# Patient Record
Sex: FEMALE | Race: WHITE | NOT HISPANIC OR LATINO
[De-identification: names, ages, dates, MRNs, and addresses within clinical notes are randomized per-mention and may not be internally consistent; named-entity substitution may affect disease eponyms.]

---
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
## 2021-03-08 ENCOUNTER — APPOINTMENT (OUTPATIENT)
Dept: PEDIATRIC PULMONARY CYSTIC FIB | Facility: CLINIC | Age: 21
End: 2021-03-08
Payer: COMMERCIAL

## 2021-03-08 VITALS
OXYGEN SATURATION: 99 % | HEART RATE: 89 BPM | BODY MASS INDEX: 34.09 KG/M2 | TEMPERATURE: 97.5 F | SYSTOLIC BLOOD PRESSURE: 145 MMHG | WEIGHT: 190 LBS | HEIGHT: 62.75 IN | DIASTOLIC BLOOD PRESSURE: 87 MMHG

## 2021-03-08 PROCEDURE — 99072 ADDL SUPL MATRL&STAF TM PHE: CPT

## 2021-03-08 PROCEDURE — 99215 OFFICE O/P EST HI 40 MIN: CPT | Mod: 25

## 2021-03-08 PROCEDURE — 95012 NITRIC OXIDE EXP GAS DETER: CPT

## 2021-03-08 NOTE — REASON FOR VISIT
[Routine Follow-Up] : a routine follow-up visit for [Patient] : patient [Mother] : mother [Asthma/RAD] : asthma/RAD [Exercise Induced Dyspnea] : exercise induced dyspnea [Rhinitis] : rhinitis [FreeTextEntry3] : History of epilepsy on medicine from Dr. Macias, this is an in person visit

## 2021-03-08 NOTE — ASSESSMENT
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

## 2021-03-08 NOTE — ASSESSMENT
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

## 2021-03-08 NOTE — HISTORY OF PRESENT ILLNESS
[FreeTextEntry1] : .  Patient is followed by pediatric pulmonary for\par 1 chronic asthma mild persistent\par 2 chronic rhinitis\par 3.  Exercise-induced asthma\par 4 obesity BMI BMI 97 percentile\par \par Since last seen patient symptoms has been              controlled well\par \par PULMONARY HPI FOR TODAY VISIT\par \par Activity: there is  no    complaint of  activity limitation : \par \par there is improvement in coughing,          wheezing, shortness of breath\par there is no stridor, distress, loss of energy, hemoptysis, fever, night sweat, weight loss\par  \par CXR:  patient has no recent Chest X Ray , no history of pneumonia\par \par SLEEP :   No snoring, restless, daytime sleepiness, bedtime issues, \par \par \par ASTHMA HPI : Asthma symptoms well controlled by Rules of Twos (day symptoms < 2 x/week; night symptoms < 2x /month, no /minimal limitations of activities, less than 2 courses of systemic steroid per 12 month, no ED visits/ hospitalization )\par \par We also discussed extensively\par

## 2021-03-15 ENCOUNTER — APPOINTMENT (OUTPATIENT)
Dept: PEDIATRIC NEUROLOGY | Facility: CLINIC | Age: 21
End: 2021-03-15
Payer: COMMERCIAL

## 2021-03-15 VITALS
WEIGHT: 190.13 LBS | HEART RATE: 40 BPM | OXYGEN SATURATION: 98 % | HEIGHT: 63 IN | DIASTOLIC BLOOD PRESSURE: 100 MMHG | BODY MASS INDEX: 33.69 KG/M2 | SYSTOLIC BLOOD PRESSURE: 136 MMHG

## 2021-03-15 DIAGNOSIS — G40.309 GENERALIZED IDIOPATHIC EPILEPSY AND EPILEPTIC SYNDROMES, NOT INTRACTABLE, W/OUT STATUS EPILEPTICUS: ICD-10-CM

## 2021-03-15 PROCEDURE — 99213 OFFICE O/P EST LOW 20 MIN: CPT

## 2021-03-15 PROCEDURE — 99072 ADDL SUPL MATRL&STAF TM PHE: CPT

## 2021-03-15 NOTE — REVIEW OF SYSTEMS
Number on file not accepting calls at this time. Will attempt at a later date. [Normal] : Psychiatric

## 2021-03-15 NOTE — HISTORY OF PRESENT ILLNESS
[FreeTextEntry1] : Nan presents in follow-up of her epilepsy.  She was last seen a year ago.  She has remained clinically seizure-free and has been compliant with her medication.  She is not experiencing any medication side effects.

## 2021-03-15 NOTE — ASSESSMENT
[FreeTextEntry1] : 20-year-old history of generalized epilepsy who has been clinically seizure-free for a number of years.  Will continue the current dose of medication.\par \par 1.  Lab work to be sent today for medication level as well as screening CBC, CMP, thyroid and cholesterol panel\par \par 2.  Plan for repeat overnight EEG next January

## 2021-06-01 ENCOUNTER — RX RENEWAL (OUTPATIENT)
Age: 21
End: 2021-06-01

## 2021-09-08 ENCOUNTER — APPOINTMENT (OUTPATIENT)
Dept: PEDIATRIC PULMONARY CYSTIC FIB | Facility: CLINIC | Age: 21
End: 2021-09-08

## 2021-09-26 ENCOUNTER — EMERGENCY (EMERGENCY)
Facility: HOSPITAL | Age: 21
LOS: 0 days | Discharge: HOME | End: 2021-09-26
Attending: EMERGENCY MEDICINE | Admitting: EMERGENCY MEDICINE
Payer: COMMERCIAL

## 2021-09-26 VITALS
SYSTOLIC BLOOD PRESSURE: 119 MMHG | TEMPERATURE: 97 F | DIASTOLIC BLOOD PRESSURE: 73 MMHG | RESPIRATION RATE: 16 BRPM | OXYGEN SATURATION: 99 % | HEART RATE: 90 BPM

## 2021-09-26 VITALS — WEIGHT: 149.91 LBS

## 2021-09-26 DIAGNOSIS — Z91.013 ALLERGY TO SEAFOOD: ICD-10-CM

## 2021-09-26 DIAGNOSIS — I47.1 SUPRAVENTRICULAR TACHYCARDIA: ICD-10-CM

## 2021-09-26 DIAGNOSIS — J45.909 UNSPECIFIED ASTHMA, UNCOMPLICATED: ICD-10-CM

## 2021-09-26 DIAGNOSIS — R06.02 SHORTNESS OF BREATH: ICD-10-CM

## 2021-09-26 DIAGNOSIS — R00.2 PALPITATIONS: ICD-10-CM

## 2021-09-26 DIAGNOSIS — Z86.69 PERSONAL HISTORY OF OTHER DISEASES OF THE NERVOUS SYSTEM AND SENSE ORGANS: ICD-10-CM

## 2021-09-26 LAB
ALBUMIN SERPL ELPH-MCNC: 4.3 G/DL — SIGNIFICANT CHANGE UP (ref 3.5–5.2)
ALP SERPL-CCNC: 109 U/L — SIGNIFICANT CHANGE UP (ref 30–115)
ALT FLD-CCNC: 13 U/L — LOW (ref 14–37)
ANION GAP SERPL CALC-SCNC: 13 MMOL/L — SIGNIFICANT CHANGE UP (ref 7–14)
AST SERPL-CCNC: 12 U/L — LOW (ref 14–37)
BASOPHILS # BLD AUTO: 0.09 K/UL — SIGNIFICANT CHANGE UP (ref 0–0.2)
BASOPHILS NFR BLD AUTO: 0.6 % — SIGNIFICANT CHANGE UP (ref 0–1)
BILIRUB SERPL-MCNC: <0.2 MG/DL — SIGNIFICANT CHANGE UP (ref 0.2–1.2)
BUN SERPL-MCNC: 14 MG/DL — SIGNIFICANT CHANGE UP (ref 10–20)
CALCIUM SERPL-MCNC: 9.3 MG/DL — SIGNIFICANT CHANGE UP (ref 8.5–10.1)
CHLORIDE SERPL-SCNC: 105 MMOL/L — SIGNIFICANT CHANGE UP (ref 98–110)
CO2 SERPL-SCNC: 21 MMOL/L — SIGNIFICANT CHANGE UP (ref 17–32)
CREAT SERPL-MCNC: 0.9 MG/DL — SIGNIFICANT CHANGE UP (ref 0.3–1)
EOSINOPHIL # BLD AUTO: 0.21 K/UL — SIGNIFICANT CHANGE UP (ref 0–0.7)
EOSINOPHIL NFR BLD AUTO: 1.4 % — SIGNIFICANT CHANGE UP (ref 0–8)
GLUCOSE SERPL-MCNC: 132 MG/DL — HIGH (ref 70–99)
HCG SERPL QL: NEGATIVE — SIGNIFICANT CHANGE UP
HCT VFR BLD CALC: 40.9 % — SIGNIFICANT CHANGE UP (ref 37–47)
HGB BLD-MCNC: 13.7 G/DL — SIGNIFICANT CHANGE UP (ref 12–16)
IMM GRANULOCYTES NFR BLD AUTO: 0.5 % — HIGH (ref 0.1–0.3)
LYMPHOCYTES # BLD AUTO: 14.2 % — LOW (ref 20.5–51.1)
LYMPHOCYTES # BLD AUTO: 2.13 K/UL — SIGNIFICANT CHANGE UP (ref 1.2–3.4)
MAGNESIUM SERPL-MCNC: 1.7 MG/DL — LOW (ref 1.8–2.4)
MCHC RBC-ENTMCNC: 29.8 PG — SIGNIFICANT CHANGE UP (ref 27–31)
MCHC RBC-ENTMCNC: 33.5 G/DL — SIGNIFICANT CHANGE UP (ref 32–37)
MCV RBC AUTO: 89.1 FL — SIGNIFICANT CHANGE UP (ref 81–99)
MONOCYTES # BLD AUTO: 0.74 K/UL — HIGH (ref 0.1–0.6)
MONOCYTES NFR BLD AUTO: 4.9 % — SIGNIFICANT CHANGE UP (ref 1.7–9.3)
NEUTROPHILS # BLD AUTO: 11.71 K/UL — HIGH (ref 1.4–6.5)
NEUTROPHILS NFR BLD AUTO: 78.4 % — HIGH (ref 42.2–75.2)
NRBC # BLD: 0 /100 WBCS — SIGNIFICANT CHANGE UP (ref 0–0)
PLATELET # BLD AUTO: 395 K/UL — SIGNIFICANT CHANGE UP (ref 130–400)
POTASSIUM SERPL-MCNC: 3.9 MMOL/L — SIGNIFICANT CHANGE UP (ref 3.5–5)
POTASSIUM SERPL-SCNC: 3.9 MMOL/L — SIGNIFICANT CHANGE UP (ref 3.5–5)
PROT SERPL-MCNC: 6.8 G/DL — SIGNIFICANT CHANGE UP (ref 6.1–8)
RBC # BLD: 4.59 M/UL — SIGNIFICANT CHANGE UP (ref 4.2–5.4)
RBC # FLD: 12.7 % — SIGNIFICANT CHANGE UP (ref 11.5–14.5)
SODIUM SERPL-SCNC: 139 MMOL/L — SIGNIFICANT CHANGE UP (ref 135–146)
WBC # BLD: 14.96 K/UL — HIGH (ref 4.8–10.8)
WBC # FLD AUTO: 14.96 K/UL — HIGH (ref 4.8–10.8)

## 2021-09-26 PROCEDURE — 93010 ELECTROCARDIOGRAM REPORT: CPT

## 2021-09-26 PROCEDURE — 99284 EMERGENCY DEPT VISIT MOD MDM: CPT | Mod: 25

## 2021-09-26 RX ORDER — SODIUM CHLORIDE 9 MG/ML
1000 INJECTION, SOLUTION INTRAVENOUS ONCE
Refills: 0 | Status: COMPLETED | OUTPATIENT
Start: 2021-09-26 | End: 2021-09-26

## 2021-09-26 RX ORDER — MAGNESIUM SULFATE 500 MG/ML
2 VIAL (ML) INJECTION ONCE
Refills: 0 | Status: COMPLETED | OUTPATIENT
Start: 2021-09-26 | End: 2021-09-26

## 2021-09-26 RX ORDER — SODIUM CHLORIDE 9 MG/ML
1000 INJECTION INTRAMUSCULAR; INTRAVENOUS; SUBCUTANEOUS ONCE
Refills: 0 | Status: COMPLETED | OUTPATIENT
Start: 2021-09-26 | End: 2021-09-26

## 2021-09-26 RX ADMIN — SODIUM CHLORIDE 1000 MILLILITER(S): 9 INJECTION INTRAMUSCULAR; INTRAVENOUS; SUBCUTANEOUS at 01:06

## 2021-09-26 RX ADMIN — Medication 50 GRAM(S): at 02:27

## 2021-09-26 RX ADMIN — SODIUM CHLORIDE 1000 MILLILITER(S): 9 INJECTION, SOLUTION INTRAVENOUS at 02:30

## 2021-09-26 NOTE — ED PROVIDER NOTE - CARE PROVIDER_API CALL
Dar Salazar  CARDIOLOGY  [address] [place] [address]  Phone: [phone]  Fax: [phone]  Follow Up Time: 1-3 Days    Yoni Young)  Pediatrics  [facility], [facility] [place] [address]  Phone: [phone]  Fax: [phone]  Follow Up Time: 1-3 Days    Michael Santiago)  Cardiac Electrophysiology; Cardiovascular Disease  [address] [place] [address]  Phone: [phone]  Fax: [phone]  Follow Up Time: 1-3 Days

## 2021-09-26 NOTE — ED PROVIDER NOTE - ATTENDING CONTRIBUTION TO CARE
I personally evaluated the patient. I reviewed the Resident’s or Physician Assistant’s note (as assigned above), and agree with the findings and plan except as documented in my note.  Chart reviewed.  Smoked THC and experienced palpitations and SOB.  Found to be in SVT by EMS, given adenosine with improvement.  Exam shows alert patient in no distress, HEENT NCAT, lungs clear, tachycardic, abdomen soft Nt +BS, no CCE.

## 2021-09-26 NOTE — ED PROVIDER NOTE - PROGRESS NOTE DETAILS
FF: pt heart rate has improved to 77bpm. pt reports she is feeling well. advised of return precautions discussed at bedside. agreeable to dc.

## 2021-09-26 NOTE — ED PROVIDER NOTE - NS ED ROS FT
CONST: No fever, chills or bodyaches  EYES: No pain, redness, drainage or visual changes.  ENT: No ear pain or discharge, nasal discharge or congestion. No sore throat  CARD: No chest pain, (+) palpitations  RESP: (+) SOB. No cough, hemoptysis. No hx of asthma or COPD  GI: No abdominal pain, N/V/D  : No urinary symptoms  MS: No joint pain, back pain or extremity pain/injury  SKIN: No rashes  NEURO: No headache, dizziness, paresthesias or LOC

## 2021-09-26 NOTE — ED PROVIDER NOTE - CLINICAL SUMMARY MEDICAL DECISION MAKING FREE TEXT BOX
Labs unremarkable except for Mg 1.7.  EKG sinus tach no acute changes.  Given IVF and Mg. Labs unremarkable except for Mg 1.7.  EKG sinus tach no acute changes.  Given IVF and Mg. Will D/C to follow up with electrophysiologist.

## 2021-09-26 NOTE — ED PROVIDER NOTE - PHYSICAL EXAMINATION
Physical Exam    Vital Signs: I have reviewed the initial vital signs.  Constitutional: well-nourished, appears stated age, no acute distress  Eyes: Conjunctiva pink, Sclera clear, PERRLA, EOMI without pain.   Cardiovascular: (+) tachycardic, regular rhythm, well-perfused extremities, radial pulses equal and 2+ b/l.   Respiratory: unlabored respiratory effort, clear to auscultation bilaterally no wheezing, rales and rhonchi. pt is speaking full sentences. no accessory muscle use.   Gastrointestinal: soft, non-tender, nondistended abdomen, no pulsatile mass, normal bowl sounds, no rebound, no guarding, no organomegaly.   Musculoskeletal: supple neck, no lower extremity edema, no calf tenderness  Integumentary: warm, dry, no rash  Neurologic: awake, alert, cranial nerves II-XII grossly intact, extremities’ motor and sensory functions grossly intact.   Psychiatric: appropriate mood, appropriate affect

## 2021-09-26 NOTE — ED PROVIDER NOTE - PROVIDER TOKENS
PROVIDER:[TOKEN:[[id number]],FOLLOWUP:[1-3 Days]],PROVIDER:[TOKEN:[[id number]],FOLLOWUP:[1-3 Days]],PROVIDER:[TOKEN:[[id number]],FOLLOWUP:[1-3 Days]]

## 2021-09-26 NOTE — ED ADULT NURSE NOTE - SUICIDE SCREENING QUESTION 1
Quality 130: Documentation Of Current Medications In The Medical Record: Current Medications Documented Quality 394a: Meningococcal Immunizations For Adolescents: Patient did not have one dose of meningococcal vaccine on or between the patient's 11th and 13th birthdays. Quality 111:Pneumonia Vaccination Status For Older Adults: Pneumococcal Vaccination not Administered or Previously Received, Reason not Otherwise Specified Quality 394b: Td/Tdap Immunizations For Adolescents: Patient did not have one Tdap or one Td vaccine on or between the patient's 10th and 13th birthdays. Quality 474: Zoster Vaccination Status: Shingrix Vaccination not Administered or Previously Received, Reason not Otherwise Specified Quality 402: Tobacco Use And Help With Quitting Among Adolescents: Patient screened for tobacco and never smoked Quality 394c: Hpv Vaccine For Adolescents: Patient did not have at least three HPV vaccines on or between the patient’s 9th and 13th birthdays. No Quality 110: Preventive Care And Screening: Influenza Immunization: Influenza immunization was not ordered or administered, reason not given Detail Level: Detailed Quality 431: Preventive Care And Screening: Unhealthy Alcohol Use - Screening: Unhealthy alcohol use screening not performed, reason not otherwise specified

## 2021-09-26 NOTE — ED PROVIDER NOTE - CARE PROVIDERS DIRECT ADDRESSES
[email or web address]

## 2021-09-26 NOTE — ED PROVIDER NOTE - NSFOLLOWUPINSTRUCTIONS_ED_ALL_ED_FT
Supraventricular Tachycardia    Supraventricular tachycardia (SVT) is a type of abnormal heart rhythm. It causes your heart to beat very quickly. A normal heart rate is 60?100 beats per minute. During an episode of SVT, your heart rate may be 150?250 beats per minute. This can make you feel light-headed and short of breath. Although SVT is usually harmless, when SVT happens often or it lasts for long periods, it can lead to heart weakness and failure. There are many causes of SVT and can be triggered by s tress, smoking, alcohol, caffeine, or stimulant drugs. SVT is diagnosed with a medical history and physical exam and usually involves an electrocardiogram (EKG). Treatment may involve your health care provider having you perform certain maneuvers, medicines, electric shock.     SEEK IMMEDIATE MEDICAL CARE IF YOU HAVE THE FOLLOWING SYMPTOMS: chest pain, shortness of breath, or dizziness/lightheadedness. These symptoms may represent a serious problem that is an emergency. Do not wait to see if the symptoms will go away. Get medical help right away. Call your local emergency services (911 in the [place] Do not drive yourself to the hospital.

## 2021-09-26 NOTE — ED PROVIDER NOTE - PATIENT PORTAL LINK FT
You can access the FollowMyHealth Patient Portal offered by [facility] by registering at the following website: [email or web address]. By joining [facility]’s FollowMyHealth portal, you will also be able to view your health information using other applications (apps) compatible with our system.

## 2021-10-26 ENCOUNTER — EMERGENCY (EMERGENCY)
Facility: HOSPITAL | Age: 21
LOS: 0 days | Discharge: HOME | End: 2021-10-26
Attending: EMERGENCY MEDICINE | Admitting: EMERGENCY MEDICINE
Payer: COMMERCIAL

## 2021-10-26 VITALS
SYSTOLIC BLOOD PRESSURE: 134 MMHG | DIASTOLIC BLOOD PRESSURE: 81 MMHG | RESPIRATION RATE: 20 BRPM | TEMPERATURE: 99 F | OXYGEN SATURATION: 100 % | WEIGHT: 169.98 LBS | HEIGHT: 63 IN | HEART RATE: 105 BPM

## 2021-10-26 VITALS
TEMPERATURE: 98 F | SYSTOLIC BLOOD PRESSURE: 105 MMHG | DIASTOLIC BLOOD PRESSURE: 56 MMHG | HEART RATE: 86 BPM | OXYGEN SATURATION: 100 % | RESPIRATION RATE: 18 BRPM

## 2021-10-26 DIAGNOSIS — J45.909 UNSPECIFIED ASTHMA, UNCOMPLICATED: ICD-10-CM

## 2021-10-26 DIAGNOSIS — R42 DIZZINESS AND GIDDINESS: ICD-10-CM

## 2021-10-26 DIAGNOSIS — R51.9 HEADACHE, UNSPECIFIED: ICD-10-CM

## 2021-10-26 DIAGNOSIS — Z91.013 ALLERGY TO SEAFOOD: ICD-10-CM

## 2021-10-26 DIAGNOSIS — Z88.0 ALLERGY STATUS TO PENICILLIN: ICD-10-CM

## 2021-10-26 DIAGNOSIS — G40.A09 ABSENCE EPILEPTIC SYNDROME, NOT INTRACTABLE, WITHOUT STATUS EPILEPTICUS: ICD-10-CM

## 2021-10-26 LAB
ALBUMIN SERPL ELPH-MCNC: 4.8 G/DL — SIGNIFICANT CHANGE UP (ref 3.5–5.2)
ALP SERPL-CCNC: 107 U/L — SIGNIFICANT CHANGE UP (ref 30–115)
ALT FLD-CCNC: 14 U/L — SIGNIFICANT CHANGE UP (ref 14–37)
ANION GAP SERPL CALC-SCNC: 11 MMOL/L — SIGNIFICANT CHANGE UP (ref 7–14)
AST SERPL-CCNC: 13 U/L — LOW (ref 14–37)
BASOPHILS # BLD AUTO: 0.1 K/UL — SIGNIFICANT CHANGE UP (ref 0–0.2)
BASOPHILS NFR BLD AUTO: 1.5 % — HIGH (ref 0–1)
BILIRUB SERPL-MCNC: 0.4 MG/DL — SIGNIFICANT CHANGE UP (ref 0.2–1.2)
BUN SERPL-MCNC: 8 MG/DL — LOW (ref 10–20)
CALCIUM SERPL-MCNC: 10 MG/DL — SIGNIFICANT CHANGE UP (ref 8.5–10.1)
CHLORIDE SERPL-SCNC: 105 MMOL/L — SIGNIFICANT CHANGE UP (ref 98–110)
CO2 SERPL-SCNC: 25 MMOL/L — SIGNIFICANT CHANGE UP (ref 17–32)
CREAT SERPL-MCNC: 0.7 MG/DL — SIGNIFICANT CHANGE UP (ref 0.3–1)
EOSINOPHIL # BLD AUTO: 0.35 K/UL — SIGNIFICANT CHANGE UP (ref 0–0.7)
EOSINOPHIL NFR BLD AUTO: 5.4 % — SIGNIFICANT CHANGE UP (ref 0–8)
GLUCOSE SERPL-MCNC: 94 MG/DL — SIGNIFICANT CHANGE UP (ref 70–99)
HCG SERPL QL: NEGATIVE — SIGNIFICANT CHANGE UP
HCT VFR BLD CALC: 43 % — SIGNIFICANT CHANGE UP (ref 37–47)
HGB BLD-MCNC: 14.4 G/DL — SIGNIFICANT CHANGE UP (ref 12–16)
IMM GRANULOCYTES NFR BLD AUTO: 0.3 % — SIGNIFICANT CHANGE UP (ref 0.1–0.3)
LYMPHOCYTES # BLD AUTO: 1.16 K/UL — LOW (ref 1.2–3.4)
LYMPHOCYTES # BLD AUTO: 17.8 % — LOW (ref 20.5–51.1)
MAGNESIUM SERPL-MCNC: 2.3 MG/DL — SIGNIFICANT CHANGE UP (ref 1.8–2.4)
MCHC RBC-ENTMCNC: 29.4 PG — SIGNIFICANT CHANGE UP (ref 27–31)
MCHC RBC-ENTMCNC: 33.5 G/DL — SIGNIFICANT CHANGE UP (ref 32–37)
MCV RBC AUTO: 87.8 FL — SIGNIFICANT CHANGE UP (ref 81–99)
MONOCYTES # BLD AUTO: 0.4 K/UL — SIGNIFICANT CHANGE UP (ref 0.1–0.6)
MONOCYTES NFR BLD AUTO: 6.1 % — SIGNIFICANT CHANGE UP (ref 1.7–9.3)
NEUTROPHILS # BLD AUTO: 4.49 K/UL — SIGNIFICANT CHANGE UP (ref 1.4–6.5)
NEUTROPHILS NFR BLD AUTO: 68.9 % — SIGNIFICANT CHANGE UP (ref 42.2–75.2)
NRBC # BLD: 0 /100 WBCS — SIGNIFICANT CHANGE UP (ref 0–0)
PLATELET # BLD AUTO: 391 K/UL — SIGNIFICANT CHANGE UP (ref 130–400)
POTASSIUM SERPL-MCNC: 4.5 MMOL/L — SIGNIFICANT CHANGE UP (ref 3.5–5)
POTASSIUM SERPL-SCNC: 4.5 MMOL/L — SIGNIFICANT CHANGE UP (ref 3.5–5)
PROT SERPL-MCNC: 7.1 G/DL — SIGNIFICANT CHANGE UP (ref 6.1–8)
RBC # BLD: 4.9 M/UL — SIGNIFICANT CHANGE UP (ref 4.2–5.4)
RBC # FLD: 12.9 % — SIGNIFICANT CHANGE UP (ref 11.5–14.5)
SODIUM SERPL-SCNC: 141 MMOL/L — SIGNIFICANT CHANGE UP (ref 135–146)
WBC # BLD: 6.52 K/UL — SIGNIFICANT CHANGE UP (ref 4.8–10.8)
WBC # FLD AUTO: 6.52 K/UL — SIGNIFICANT CHANGE UP (ref 4.8–10.8)

## 2021-10-26 PROCEDURE — 93010 ELECTROCARDIOGRAM REPORT: CPT

## 2021-10-26 PROCEDURE — 99284 EMERGENCY DEPT VISIT MOD MDM: CPT

## 2021-10-26 RX ORDER — KETOROLAC TROMETHAMINE 30 MG/ML
15 SYRINGE (ML) INJECTION ONCE
Refills: 0 | Status: DISCONTINUED | OUTPATIENT
Start: 2021-10-26 | End: 2021-10-26

## 2021-10-26 RX ORDER — ETHOSUXIMIDE 250 MG/1
250 CAPSULE ORAL
Qty: 0 | Refills: 0 | DISCHARGE

## 2021-10-26 RX ORDER — MELOXICAM 15 MG/1
1 TABLET ORAL
Qty: 15 | Refills: 0
Start: 2021-10-26 | End: 2021-11-09

## 2021-10-26 RX ORDER — METOCLOPRAMIDE HCL 10 MG
10 TABLET ORAL ONCE
Refills: 0 | Status: COMPLETED | OUTPATIENT
Start: 2021-10-26 | End: 2021-10-26

## 2021-10-26 RX ORDER — ACETAMINOPHEN 500 MG
650 TABLET ORAL ONCE
Refills: 0 | Status: DISCONTINUED | OUTPATIENT
Start: 2021-10-26 | End: 2021-10-26

## 2021-10-26 RX ORDER — ALBUTEROL 90 UG/1
0 AEROSOL, METERED ORAL
Qty: 0 | Refills: 0 | DISCHARGE

## 2021-10-26 RX ORDER — METOCLOPRAMIDE HCL 10 MG
1 TABLET ORAL
Qty: 20 | Refills: 0
Start: 2021-10-26 | End: 2021-11-01

## 2021-10-26 RX ORDER — BECLOMETHASONE DIPROPIONATE 40 UG/1
0 AEROSOL, METERED RESPIRATORY (INHALATION)
Qty: 0 | Refills: 0 | DISCHARGE

## 2021-10-26 RX ORDER — SODIUM CHLORIDE 9 MG/ML
1000 INJECTION INTRAMUSCULAR; INTRAVENOUS; SUBCUTANEOUS ONCE
Refills: 0 | Status: COMPLETED | OUTPATIENT
Start: 2021-10-26 | End: 2021-10-26

## 2021-10-26 RX ORDER — ETHOSUXIMIDE 250 MG/1
2 CAPSULE ORAL
Qty: 0 | Refills: 0 | DISCHARGE

## 2021-10-26 RX ADMIN — Medication 104 MILLIGRAM(S): at 07:50

## 2021-10-26 RX ADMIN — SODIUM CHLORIDE 1000 MILLILITER(S): 9 INJECTION INTRAMUSCULAR; INTRAVENOUS; SUBCUTANEOUS at 07:41

## 2021-10-26 RX ADMIN — Medication 15 MILLIGRAM(S): at 07:47

## 2021-10-26 NOTE — ED PROVIDER NOTE - CARE PROVIDER_API CALL
Areli Macias)  Child Neurology; EEGEpilepsy; Pediatric Neurology  [address] [place] [address]  Phone: [phone]  Fax: [phone]  Follow Up Time: 1-3 Days

## 2021-10-26 NOTE — ED PROVIDER NOTE - OBJECTIVE STATEMENT
20 y.o female w/ hx of absence seizures presents to the ED for evaluation of headache x 1 week.  Intermittent, mild severity, frontal, no radiation of pain, alleviated w/ tylenol or motrin. NO associated photophobia, phonophobia, nausea, vomiting, focal weakness, paresthesias.  Over few days now reports associated lightheaded sensation which is improved with standing prompting visit to the ED. Recently started decreasing caffeine consumption.  no recent head injury or trauma.

## 2021-10-26 NOTE — ED PROVIDER NOTE - PROGRESS NOTE DETAILS
feel improved at this time.  f/u with Dr. walker, neuro.  Discussed results with pt.  All questions were answered and return precautions discussed.  Pt is asx and comfortable at this time.  Unremarkable re-exam.  No further concerns at this time from pt.  Will follow up with PMD and neuro.  Pt/mother understands and agrees with tx plan.

## 2021-10-26 NOTE — ED PROVIDER NOTE - ATTENDING CONTRIBUTION TO CARE
I personally evaluated this pediatric patient. I agree with the findings and plan with all documentation on chart except as documented  in my note.    21 y/o F PMHX Absence seizures, follows with Dr. Macias, who presents to the ED for evaluation of headache x 1 week.  Intermittent, mild severity, frontal, no radiation of pain, alleviated w/ tylenol or motrin. NO associated photophobia, phonophobia, nausea, vomiting, focal weakness, paresthesias.  Over few days now reports associated lightheaded sensation which is improved with standing prompting visit to the ED. Recently started decreasing caffeine consumption.  no recent head injury or trauma.    Patient presents to the Emergency Department with a headache. Patient has a normal neuro exam, No vision changes. Patient has no neck stiffness or meningeal signs on exam. This is not the worst headache of patients life and clinically this is not a subarachnoid hemorrhage. Patient improved in the ED with meds. Patient to follow up with Dr. Macias    I discussed with patient need for possible MRI for further work up for their symptoms and how this was not possible in the Emergency Department at this time.  Mother aware Dr. Macias will evaluate and determine if she needs this test. Follow up being provided to have further evaluation for this test given.    Full DC instructions discussed and parent knows when to seek immediate medical attention.  Patient has proper follow up with pediatrician.  All results discussed and parent aware they may require further work up.  Proper follow up ensured. Limitations of ED work up discussed.  Medications administered and prescribed/OTC home meds discussed.  Appropriate supportive care discussed in detail. All questions and concerns from patient or family addressed. Understanding of instructions verbalized.

## 2021-10-26 NOTE — ED PROVIDER NOTE - ADDITIONAL NOTES AND INSTRUCTIONS:
Please be advised that Nan could not be at school/work for today Please be advised that Ms. Kenya Escalera was in the Emergency Department today with her daughter and could not be at work

## 2021-10-26 NOTE — ED PROVIDER NOTE - CLINICAL SUMMARY MEDICAL DECISION MAKING FREE TEXT BOX
19 y/o F PMHX Absence seizures, follows with Dr. Macias, who presents to the ED for evaluation of headache x 1 week.  Intermittent, mild severity, frontal, no radiation of pain, alleviated w/ tylenol or motrin. NO associated photophobia, phonophobia, nausea, vomiting, focal weakness, paresthesias.  Over few days now reports associated lightheaded sensation which is improved with standing prompting visit to the ED. Recently started decreasing caffeine consumption.  no recent head injury or trauma.    Patient presents to the Emergency Department with a headache. Patient has a normal neuro exam, No vision changes. Patient has no neck stiffness or meningeal signs on exam. This is not the worst headache of patients life and clinically this is not a subarachnoid hemorrhage. Patient improved in the ED with meds. Patient to follow up with Dr. Macias    I discussed with patient need for possible MRI for further work up for their symptoms and how this was not possible in the Emergency Department at this time.  Mother aware Dr. Macias will evaluate and determine if she needs this test. Follow up being provided to have further evaluation for this test given.    Full DC instructions discussed and parent knows when to seek immediate medical attention.  Patient has proper follow up with pediatrician.  All results discussed and parent aware they may require further work up.  Proper follow up ensured. Limitations of ED work up discussed.  Medications administered and prescribed/OTC home meds discussed.  Appropriate supportive care discussed in detail. All questions and concerns from patient or family addressed. Understanding of instructions verbalized.

## 2021-10-26 NOTE — ED ADULT TRIAGE NOTE - CHIEF COMPLAINT QUOTE
Pt reports feeling dizzy last week, then again last night and this morning.  She also has pain behind her right eye

## 2021-10-26 NOTE — ED PROVIDER NOTE - NSFOLLOWUPINSTRUCTIONS_ED_ALL_ED_FT
Headache    A headache is pain or discomfort felt around the head or neck area. The specific cause of a headache may not be found as there are many types including tension headaches, migraine headaches, and cluster headaches. Watch your condition for any changes. Things you can do to manage your pain include taking over the counter and prescription medications as instructed by your health care provider, lying down in a dark quiet room, limiting stress, getting regular sleep, and refraining from alcohol and tobacco products.    SEEK IMMEDIATE MEDICAL CARE IF YOU HAVE ANY OF THE FOLLOWING SYMPTOMS: fever, vomiting, stiff neck, loss of vision, problems with speech, muscle weakness, loss of balance, trouble walking, passing out, or confusion.    Follow up with your primary medical doctor in 1-2 days Headache    Please take medications as prescribed and follow up with Dr. Macias as scheduled. As discussed, Nan may require further work up.     A headache is pain or discomfort felt around the head or neck area. The specific cause of a headache may not be found as there are many types including tension headaches, migraine headaches, and cluster headaches. Watch your condition for any changes. Things you can do to manage your pain include taking over the counter and prescription medications as instructed by your health care provider, lying down in a dark quiet room, limiting stress, getting regular sleep, and refraining from alcohol and tobacco products.    SEEK IMMEDIATE MEDICAL CARE IF YOU HAVE ANY OF THE FOLLOWING SYMPTOMS: fever, vomiting, stiff neck, loss of vision, problems with speech, muscle weakness, loss of balance, trouble walking, passing out, or confusion.    Follow up with your primary medical doctor in 1-2 days

## 2021-10-26 NOTE — ED PROVIDER NOTE - NSCAREINITIATED _GEN_ER
Problem: Patient Care Overview  Goal: Plan of Care/Patient Progress Review  PT7D: HOLD- per discussion with MD prior to initiating treatment this date, pt not appropriate for PT as there are further concerns for blood flow to L LE. Pt to have further follow up tomorrow for arterial flow to determine ongoing medical plan. Will hold PT until further medical POC established to determine WB status and mobility needs.       Stan Baltazar)

## 2021-10-26 NOTE — ED PROVIDER NOTE - PHYSICAL EXAMINATION
CONST: Well appearing in NAD  EYES: PERRL, EOMI, IOP R eye 18, L eye 19, Sclera and conjunctiva clear, no nystagmus, no pain w/ EOM.   ENT: No nasal discharge. TM's clear B/L without drainage. Oropharynx normal appearing, no erythema or exudates. Uvula midline.  NECK: Non-tender, no meningeal signs, supple  CARD: Normal S1 S2; Normal rate and rhythm  RESP: Equal BS B/L, No wheezes, rhonchi or rales. No distress  GI: Soft, non-tender, non-distended.  MS: Normal ROM in all extremities. No edema of lower extremities, no calf pain, radial pulses 2+ bilaterally  SKIN: Warm, dry, no acute rashes. Good turgor  NEURO: A&Ox3, CN II-XII intact, no focal deficits, no facial asymmetry, no pronator drift, normal finger to nose, no nystagmus, gross sensation intact, UE/LE strength 5/5, stable gait

## 2021-10-26 NOTE — ED ADULT NURSE NOTE - NSIMPLEMENTINTERV_GEN_ALL_ED
Implemented All Fall Risk Interventions:  [place] to call system. Call bell, personal items and telephone within reach. Instruct patient to call for assistance. Room bathroom lighting operational. Non-slip footwear when patient is off stretcher. Physically safe environment: no spills, clutter or unnecessary equipment. Stretcher in lowest position, wheels locked, appropriate side rails in place. Provide visual cue, wrist band, yellow gown, etc. Monitor gait and stability. Monitor for mental status changes and reorient to person, place, and time. Review medications for side effects contributing to fall risk. Reinforce activity limits and safety measures with patient and family.

## 2021-10-26 NOTE — ED PROVIDER NOTE - PATIENT PORTAL LINK FT
You can access the FollowMyHealth Patient Portal offered by [facility] by registering at the following website: [email or web address]. By joining [facility]’s FollowMyHealth portal, you will also be able to view your health information using other applications (apps) compatible with our system.

## 2021-10-26 NOTE — ED PROVIDER NOTE - NS ED ROS FT
Constitutional: See HPI.  Eyes: No visual changes, eye pain or discharge. No Photophobia  ENMT: No hearing changes, pain, discharge or infections. No neck pain or stiffness. No limited ROM  Cardiac: No SOB or edema. No chest pain with exertion.  Respiratory: No cough or respiratory distress.  GI: No nausea, vomiting, diarrhea or abdominal pain.  : No dysuria, frequency or burning. No Discharge  MS: No myalgia, muscle weakness, joint pain or back pain.  Neuro: + headache. No weakness. No LOC.  Skin: No skin rash.  Except as documented in the HPI, all other systems are negative.

## 2021-10-28 ENCOUNTER — EMERGENCY (EMERGENCY)
Facility: HOSPITAL | Age: 21
LOS: 0 days | Discharge: HOME | End: 2021-10-28
Attending: EMERGENCY MEDICINE | Admitting: EMERGENCY MEDICINE
Payer: COMMERCIAL

## 2021-10-28 VITALS
HEIGHT: 63 IN | HEART RATE: 106 BPM | DIASTOLIC BLOOD PRESSURE: 87 MMHG | TEMPERATURE: 99 F | SYSTOLIC BLOOD PRESSURE: 156 MMHG | RESPIRATION RATE: 20 BRPM | OXYGEN SATURATION: 99 %

## 2021-10-28 DIAGNOSIS — R51.9 HEADACHE, UNSPECIFIED: ICD-10-CM

## 2021-10-28 DIAGNOSIS — J45.909 UNSPECIFIED ASTHMA, UNCOMPLICATED: ICD-10-CM

## 2021-10-28 DIAGNOSIS — Z88.0 ALLERGY STATUS TO PENICILLIN: ICD-10-CM

## 2021-10-28 DIAGNOSIS — Z91.013 ALLERGY TO SEAFOOD: ICD-10-CM

## 2021-10-28 DIAGNOSIS — M54.6 PAIN IN THORACIC SPINE: ICD-10-CM

## 2021-10-28 LAB — HCG SERPL QL: NEGATIVE — SIGNIFICANT CHANGE UP

## 2021-10-28 PROCEDURE — 99285 EMERGENCY DEPT VISIT HI MDM: CPT

## 2021-10-28 PROCEDURE — 70450 CT HEAD/BRAIN W/O DYE: CPT | Mod: 26,MA

## 2021-10-28 RX ORDER — ONDANSETRON 8 MG/1
4 TABLET, FILM COATED ORAL ONCE
Refills: 0 | Status: COMPLETED | OUTPATIENT
Start: 2021-10-28 | End: 2021-10-28

## 2021-10-28 RX ORDER — ACETAMINOPHEN 500 MG
975 TABLET ORAL ONCE
Refills: 0 | Status: COMPLETED | OUTPATIENT
Start: 2021-10-28 | End: 2021-10-28

## 2021-10-28 RX ORDER — KETOROLAC TROMETHAMINE 30 MG/ML
15 SYRINGE (ML) INJECTION ONCE
Refills: 0 | Status: DISCONTINUED | OUTPATIENT
Start: 2021-10-28 | End: 2021-10-28

## 2021-10-28 RX ORDER — MAGNESIUM SULFATE 500 MG/ML
2 VIAL (ML) INJECTION ONCE
Refills: 0 | Status: COMPLETED | OUTPATIENT
Start: 2021-10-28 | End: 2021-10-28

## 2021-10-28 RX ORDER — SODIUM CHLORIDE 9 MG/ML
1000 INJECTION, SOLUTION INTRAVENOUS ONCE
Refills: 0 | Status: COMPLETED | OUTPATIENT
Start: 2021-10-28 | End: 2021-10-28

## 2021-10-28 RX ADMIN — ONDANSETRON 4 MILLIGRAM(S): 8 TABLET, FILM COATED ORAL at 21:22

## 2021-10-28 RX ADMIN — Medication 975 MILLIGRAM(S): at 21:21

## 2021-10-28 RX ADMIN — Medication 15 MILLIGRAM(S): at 23:30

## 2021-10-28 RX ADMIN — Medication 50 GRAM(S): at 21:22

## 2021-10-28 RX ADMIN — SODIUM CHLORIDE 1000 MILLILITER(S): 9 INJECTION, SOLUTION INTRAVENOUS at 21:23

## 2021-10-28 NOTE — ED PROVIDER NOTE - PROGRESS NOTE DETAILS
ck: patient reports moderate improvement of symptoms after meds. CT negative. patient and mother at bedside given strict return precautions, educated on supportive care and importance of fu with neurologist this upcoming week.

## 2021-10-28 NOTE — ED PROVIDER NOTE - ATTENDING CONTRIBUTION TO CARE
20yF absence seizures p/w headaches, intermittent over the past few months.  Pt seen in the ED 2d ago for similar headache, treated w/ partial improvement and went home.  Pt now returns w/ recurrent headache, requesting CT.  HA is not sudden onset or WHOL.  Pt has scheduled f/u with neuro for next week but doesn't feel she can wait that long.    no focal neuro deficits  no fevers or s/s illness

## 2021-10-28 NOTE — ED PROVIDER NOTE - NSTIMEPROVIDERCAREINITIATE_GEN_ER
Patient had questions about Mg. After correcting the dose in Pt's charts - magnesium was marked as 'stop tacking\" in AVS.   Reviewed note - patient to continue tacking his Mg as before.   Verbalized understanding.    
Pls call pt back, has questions re meds. Was here yest. Has questions from AVS.   [phone]  
28-Oct-2021 20:05

## 2021-10-28 NOTE — ED PROVIDER NOTE - CLINICAL SUMMARY MEDICAL DECISION MAKING FREE TEXT BOX
20yF absence seizures p/w intermittent headaches, likely tension HA or migraines.  Serum preg neg.  CTH w/o acute pathology.  Pt improved with meds.  Recommend supportive care, o/p neuro f/u, return precautions.

## 2021-10-28 NOTE — ED PROVIDER NOTE - NSFOLLOWUPINSTRUCTIONS_ED_ALL_ED_FT
Please follow up with your primary care doctor and neurologist in 1-3 days  Please be aware of any new or worsening signs or symptoms that should prompt your return to the ER.      Headache    A headache is pain or discomfort felt around the head or neck area. The specific cause of a headache may not be found as there are many types including tension headaches, migraine headaches, and cluster headaches. Watch your condition for any changes. Things you can do to manage your pain include taking over the counter and prescription medications as instructed by your health care provider, lying down in a dark quiet room, limiting stress, getting regular sleep, and refraining from alcohol and tobacco products.    SEEK IMMEDIATE MEDICAL CARE IF YOU EXPERIENCE THE FOLLOWING SYMPTOMS: fever, vomiting, stiff neck, loss of vision, problems with speech, muscle weakness, loss of balance, trouble walking, pass out, or confusion.

## 2021-10-28 NOTE — ED PROVIDER NOTE - PATIENT PORTAL LINK FT
You can access the FollowMyHealth Patient Portal offered by [facility] by registering at the following website: [email or web address]. By joining [employer]’s FollowMyHealth portal, you will also be able to view your health information using other applications (apps) compatible with our system.

## 2021-10-28 NOTE — ED PROVIDER NOTE - OBJECTIVE STATEMENT
20 year old female, past medical history absence seizures on ethosuximide, who presents with headache xseveral months. patient reports right sided frontal headache intermittently x3-4 months, was evaluated in ED x2 days ago, felt moderate improvement and discharged home. patient reports gradual return of symptoms with upper back and neck muscle spasm radiating to front of forehead. denies fever, chills, vision changes, chest pain, SOB, numbness/weakness/paresthesias, syncope. patient has scheduled appt with neurologist, Dr Macias, this upcoming week, 11/2.

## 2021-10-28 NOTE — ED PROVIDER NOTE - PHYSICAL EXAMINATION
CONSTITUTIONAL: Well-developed; well-nourished; in no acute distress, nontoxic appearing  SKIN: skin exam is warm and dry  HEAD: Normocephalic; atraumatic  EYES: PERRL, EOMI, no nystagmus, conjunctiva and sclera clear  ENT: MMM  NECK: ROM intact. No nuchal rigidity.   CARD: S1, S2 normal, no murmur  RESP: No wheezes, rales or rhonchi. Good air movement bilaterally  ABD: soft; non-distended; non-tender. No Rebound, No guarding  EXT: Normal ROM.   NEURO: awake, alert, following commands, oriented, grossly unremarkable. No Focal deficits. GCS 15. Steady gait. Normal finger to nose. Negative pronator drift.   PSYCH: Cooperative, appropriate.

## 2021-10-28 NOTE — ED PROVIDER NOTE - NS ED ROS FT
Review of Systems:  	•	CONSTITUTIONAL: no fever, no diaphoresis, no chills  	•	SKIN: no rash  	•	HEMATOLOGIC: no bleeding, no bruising  	•	EYES: no eye pain, no blurry vision  	•	ENT: no change in hearing, no tinnitus   	•	RESPIRATORY: no shortness of breath, no cough  	•	CARDIAC: no chest pain, no palpitations  	•	GI: no abd pain,  no vomiting, no diarrhea   	•	MUSCULOSKELETAL: no joint paint, no swelling, no redness  	•	NEUROLOGIC: +headache, no weakness, no syncope   	•	PSYCH: no anxiety, non suicidal, non homicidal, no hallucination, no depression

## 2021-10-29 ENCOUNTER — EMERGENCY (EMERGENCY)
Facility: HOSPITAL | Age: 21
LOS: 0 days | Discharge: HOME | End: 2021-10-30
Attending: PEDIATRICS | Admitting: PEDIATRICS
Payer: COMMERCIAL

## 2021-10-29 VITALS
OXYGEN SATURATION: 100 % | RESPIRATION RATE: 18 BRPM | SYSTOLIC BLOOD PRESSURE: 154 MMHG | TEMPERATURE: 98 F | HEART RATE: 82 BPM | DIASTOLIC BLOOD PRESSURE: 89 MMHG | WEIGHT: 169.98 LBS

## 2021-10-29 DIAGNOSIS — R11.0 NAUSEA: ICD-10-CM

## 2021-10-29 DIAGNOSIS — G40.909 EPILEPSY, UNSPECIFIED, NOT INTRACTABLE, WITHOUT STATUS EPILEPTICUS: ICD-10-CM

## 2021-10-29 DIAGNOSIS — G43.909 MIGRAINE, UNSPECIFIED, NOT INTRACTABLE, WITHOUT STATUS MIGRAINOSUS: ICD-10-CM

## 2021-10-29 DIAGNOSIS — Z20.822 CONTACT WITH AND (SUSPECTED) EXPOSURE TO COVID-19: ICD-10-CM

## 2021-10-29 DIAGNOSIS — R42 DIZZINESS AND GIDDINESS: ICD-10-CM

## 2021-10-29 DIAGNOSIS — J45.909 UNSPECIFIED ASTHMA, UNCOMPLICATED: ICD-10-CM

## 2021-10-29 DIAGNOSIS — H57.11 OCULAR PAIN, RIGHT EYE: ICD-10-CM

## 2021-10-29 DIAGNOSIS — R51.9 HEADACHE, UNSPECIFIED: ICD-10-CM

## 2021-10-29 DIAGNOSIS — Z91.013 ALLERGY TO SEAFOOD: ICD-10-CM

## 2021-10-29 DIAGNOSIS — R63.0 ANOREXIA: ICD-10-CM

## 2021-10-29 DIAGNOSIS — Z88.0 ALLERGY STATUS TO PENICILLIN: ICD-10-CM

## 2021-10-29 PROBLEM — G40.A09 ABSENCE EPILEPTIC SYNDROME, NOT INTRACTABLE, WITHOUT STATUS EPILEPTICUS: Chronic | Status: ACTIVE | Noted: 2021-10-26

## 2021-10-29 PROCEDURE — 99284 EMERGENCY DEPT VISIT MOD MDM: CPT

## 2021-10-29 RX ORDER — METOCLOPRAMIDE HCL 10 MG
15 TABLET ORAL ONCE
Refills: 0 | Status: COMPLETED | OUTPATIENT
Start: 2021-10-29 | End: 2021-10-29

## 2021-10-29 RX ORDER — SODIUM CHLORIDE 9 MG/ML
1550 INJECTION INTRAMUSCULAR; INTRAVENOUS; SUBCUTANEOUS ONCE
Refills: 0 | Status: COMPLETED | OUTPATIENT
Start: 2021-10-29 | End: 2021-10-29

## 2021-10-29 RX ADMIN — Medication 12 MILLIGRAM(S): at 23:50

## 2021-10-29 RX ADMIN — Medication 78 MILLIGRAM(S): at 23:50

## 2021-10-29 RX ADMIN — SODIUM CHLORIDE 1550 MILLILITER(S): 9 INJECTION INTRAMUSCULAR; INTRAVENOUS; SUBCUTANEOUS at 23:50

## 2021-10-29 NOTE — ED PROVIDER NOTE - CARE PROVIDERS DIRECT ADDRESSES
[email or web address]

## 2021-10-29 NOTE — ED PROVIDER NOTE - NS ED ROS FT
General: No fevers, no chills, no irritability, no decrease in activity.  Head: (+) Headache, lightheadedness.  Eyes: No eye discharge, no eye redness, no eyelid swelling, no blurry vision.  ENT: No throat pain, no nasal congestion, no rhinorrhea, no otalgia.  Neck: No pain, no swollen lymph nodes.  RESP: No cough, no wheezing, no shortness of breath.  CVS: No chest pain, no palpitations.  GI: (+) Nausea. No abdominal pain, no vomiting, no diarrhea, no constipation.  : No dysuria, no frequency, no urgency, no hematuria.   Neuro: No numbness, no weakness, no tingling.  MSK: No joint pain, no decreased ROM, no swelling, no erythema of joints.  SKIN: No itching, no rashes.

## 2021-10-29 NOTE — ED PROVIDER NOTE - ATTENDING CONTRIBUTION TO CARE
I personally evaluated the patient. I reviewed the Resident’s  note (as assigned above), and agree with the findings and plan except as documented in my note.  ~21 y/o girl here with almost intractable migraine also w sz disorder and optho disturbance, has f/u appts but no relief from @ home rx   pe light sensitive , neck supple but + discomfort , nonfocal neuro exam rest of pe wal  will consult neuro

## 2021-10-29 NOTE — ED PROVIDER NOTE - NSFOLLOWUPINSTRUCTIONS_ED_ALL_ED_FT
Please take     Headache    A headache is pain or discomfort felt around the head or neck area. The specific cause of a headache may not be found as there are many types including tension headaches, migraine headaches, and cluster headaches. Watch your condition for any changes. Things you can do to manage your pain include taking over the counter and prescription medications as instructed by your health care provider, lying down in a dark quiet room, limiting stress, getting regular sleep, and refraining from alcohol and tobacco products.    SEEK IMMEDIATE MEDICAL CARE IF YOU HAVE ANY OF THE FOLLOWING SYMPTOMS: fever, vomiting, stiff neck, loss of vision, problems with speech, muscle weakness, loss of balance, trouble walking, passing out, or confusion. Please take Prednisone 1 tablet by mouth once daily for 5 days. Please do not take other pain medications while taking Prednisone. Follow up with Neurology (Dr. Macias) as scheduled.    Headache    A headache is pain or discomfort felt around the head or neck area. The specific cause of a headache may not be found as there are many types including tension headaches, migraine headaches, and cluster headaches. Watch your condition for any changes. Things you can do to manage your pain include taking over the counter and prescription medications as instructed by your health care provider, lying down in a dark quiet room, limiting stress, getting regular sleep, and refraining from alcohol and tobacco products.    SEEK IMMEDIATE MEDICAL CARE IF YOU HAVE ANY OF THE FOLLOWING SYMPTOMS: fever, vomiting, stiff neck, loss of vision, problems with speech, muscle weakness, loss of balance, trouble walking, passing out, or confusion.

## 2021-10-29 NOTE — ED PEDIATRIC TRIAGE NOTE - CHIEF COMPLAINT QUOTE
Patient complaining of persistent headache with associated nausea and vomiting. Patient seen multiple times for same complaint and currently being followed by neurologist for absent seizures.

## 2021-10-29 NOTE — ED PROVIDER NOTE - PHYSICAL EXAMINATION
General: Awake, alert, NAD.  HEENT: NCAT, PERRL, (+) TTP to R retroorbital region, oropharynx without erythema or exudates, TM's non-bulging, non-erythematous, moist mucous membranes.  RESP: CTAB, no increased work of breathing.  CVS: S1, S2, no murmurs, cap refill <2 sec, 2+ peripheral pulses.  ABD: (+) BS, soft, NTND, no masses.  MSK: FROM in all extremities, no tenderness, no deformities.  NEURO: CNs II-XII grossly intact, motor 5/5, normal tone.  SKIN: Warm, dry, well-perfused, no rashes.

## 2021-10-29 NOTE — ED PROVIDER NOTE - PROGRESS NOTE DETAILS
Patient's pain improved s/p meds and IVF. Will discharge with Prednisone, return precautions, and follow up with Dr. Macias as scheduled on 11/2/21. Spoke with Neurology (Dr. Hancock), recommended IVF, Reglan, Solumedrol, and discharge with 5 day Prednisone course (no other analgesics with Prednisone).

## 2021-10-29 NOTE — ED PEDIATRIC NURSE NOTE - OBJECTIVE STATEMENT
Patient presents to ED c/o persistent headache with associated nausea and vomiting. Patient seen multiple times for same complaint and currently being followed by neurologist for absent seizures. Pt on assessment NAD noted at this time. Pt is concern medication does not help. As per mom " medication is not helping her she still feels the same".

## 2021-10-29 NOTE — ED PROVIDER NOTE - OBJECTIVE STATEMENT
20 y.o. F with PMH of absence seizures on ethosuximide and asthma, presenting with headache x1 month and lightheadedness x1 week. 20 y.o. F with PMH of absence seizures on ethosuximide and asthma, presenting with headache x1 month and lightheadedness x1 week. Patient reports intermittent R retroorbital pain, not relieved by Tylenol, associated with lightheadedness and nausea. Mother reports 20 lb weight loss since 2 months ago due to pain and loss of appetite. Endorses seeing black dots prior to headache onset but denies blurry vision, sensitivity to light, vomiting, LOC, SOB, abdominal pain, or recent illness. She went to -ED yesterday and 3 days ago, where CBC, CMP, and CT head were unremarkable. She was prescribed Reglan and Excedrin (last taken at 7 PM), with minimal relief. Patient follows with Neurology (Dr. Macias), has visit scheduled for 11/2/21. Her PMD also recommended seeing an ophthalmologist, has appointment scheduled for 11/5/21. No PSH, home med ethosuximide and Albuterol PRN, penicillin allergy (hives), vaccines UTD, PMD Dr. Mckay.

## 2021-10-29 NOTE — ED PROVIDER NOTE - CARE PROVIDER_API CALL
Areli Macias)  Child Neurology; EEGEpilepsy; Pediatric Neurology  [address] [place] [address], [address]  [place] [address]  Phone: [phone]  Fax: [phone]  Scheduled Appointment: 11/02/2021    Maia Whyte  PEDIATRICS  [address] [place] [address]  Phone: [phone]  Fax: [phone]  Follow Up Time: 1-3 Days

## 2021-10-29 NOTE — ED PROVIDER NOTE - CLINICAL SUMMARY MEDICAL DECISION MAKING FREE TEXT BOX
case discussed with neuro , some improvement after steroids  will dc home on same f/u with neuro as opd

## 2021-10-29 NOTE — ED PROVIDER NOTE - PROVIDER TOKENS
PROVIDER:[TOKEN:[[id number]],SCHEDULEDAPPT:[11/02/2021]],PROVIDER:[TOKEN:[[id number]],FOLLOWUP:[1-3 Days]]

## 2021-10-29 NOTE — ED PROVIDER NOTE - PATIENT PORTAL LINK FT
You can access the FollowMyHealth Patient Portal offered by [facility] by registering at the following website: [email or web address]. By joining [employer]’s FollowMyHealth portal, you will also be able to view your health information using other applications (apps) compatible with our system.

## 2021-10-30 LAB
RAPID RVP RESULT: SIGNIFICANT CHANGE UP
SARS-COV-2 RNA SPEC QL NAA+PROBE: SIGNIFICANT CHANGE UP

## 2021-10-31 ENCOUNTER — EMERGENCY (EMERGENCY)
Facility: HOSPITAL | Age: 21
LOS: 0 days | Discharge: HOME | End: 2021-10-31
Attending: STUDENT IN AN ORGANIZED HEALTH CARE EDUCATION/TRAINING PROGRAM | Admitting: STUDENT IN AN ORGANIZED HEALTH CARE EDUCATION/TRAINING PROGRAM
Payer: COMMERCIAL

## 2021-10-31 VITALS
DIASTOLIC BLOOD PRESSURE: 81 MMHG | TEMPERATURE: 98 F | HEART RATE: 67 BPM | OXYGEN SATURATION: 98 % | WEIGHT: 170.2 LBS | RESPIRATION RATE: 18 BRPM | HEIGHT: 63 IN | SYSTOLIC BLOOD PRESSURE: 139 MMHG

## 2021-10-31 DIAGNOSIS — Z88.0 ALLERGY STATUS TO PENICILLIN: ICD-10-CM

## 2021-10-31 DIAGNOSIS — R11.0 NAUSEA: ICD-10-CM

## 2021-10-31 DIAGNOSIS — H57.11 OCULAR PAIN, RIGHT EYE: ICD-10-CM

## 2021-10-31 DIAGNOSIS — Z91.013 ALLERGY TO SEAFOOD: ICD-10-CM

## 2021-10-31 DIAGNOSIS — Z86.69 PERSONAL HISTORY OF OTHER DISEASES OF THE NERVOUS SYSTEM AND SENSE ORGANS: ICD-10-CM

## 2021-10-31 DIAGNOSIS — R51.9 HEADACHE, UNSPECIFIED: ICD-10-CM

## 2021-10-31 DIAGNOSIS — R42 DIZZINESS AND GIDDINESS: ICD-10-CM

## 2021-10-31 DIAGNOSIS — G43.001 MIGRAINE WITHOUT AURA, NOT INTRACTABLE, WITH STATUS MIGRAINOSUS: ICD-10-CM

## 2021-10-31 DIAGNOSIS — J45.909 UNSPECIFIED ASTHMA, UNCOMPLICATED: ICD-10-CM

## 2021-10-31 PROCEDURE — 99284 EMERGENCY DEPT VISIT MOD MDM: CPT

## 2021-10-31 RX ORDER — METOCLOPRAMIDE HCL 10 MG
10 TABLET ORAL ONCE
Refills: 0 | Status: COMPLETED | OUTPATIENT
Start: 2021-10-31 | End: 2021-10-31

## 2021-10-31 RX ORDER — ACETAMINOPHEN 500 MG
650 TABLET ORAL ONCE
Refills: 0 | Status: COMPLETED | OUTPATIENT
Start: 2021-10-31 | End: 2021-10-31

## 2021-10-31 RX ORDER — KETOROLAC TROMETHAMINE 30 MG/ML
15 SYRINGE (ML) INJECTION ONCE
Refills: 0 | Status: DISCONTINUED | OUTPATIENT
Start: 2021-10-31 | End: 2021-10-31

## 2021-10-31 RX ORDER — MAGNESIUM SULFATE 500 MG/ML
2 VIAL (ML) INJECTION ONCE
Refills: 0 | Status: COMPLETED | OUTPATIENT
Start: 2021-10-31 | End: 2021-10-31

## 2021-10-31 RX ADMIN — Medication 10 MILLIGRAM(S): at 17:48

## 2021-10-31 RX ADMIN — Medication 15 MILLIGRAM(S): at 16:07

## 2021-10-31 RX ADMIN — Medication 50 GRAM(S): at 16:09

## 2021-10-31 RX ADMIN — Medication 650 MILLIGRAM(S): at 16:08

## 2021-10-31 NOTE — ED PROVIDER NOTE - CLINICAL SUMMARY MEDICAL DECISION MAKING FREE TEXT BOX
.    19 y/o F pmh absence sz p/w intermittent HA x1mo, worse x1wk. R sided, + retro-orbital pain. + photophobia, + nausea, + lightheadedness. See in ED multiple times past week for same- sx resolve in ED, then return- has not yet been able to see neurologist dr. walker. No fever, neck pain, focal neuro deficit. 10/28 had neg CTH.    Pt is NAD; NCAT; neck supple; neuro non focal.    Pt got meds and IVF, HA resolved.  I spoke w/ Dr. Hancock, Neuro, feels pt likely has status migranosous and recc pt follow tx established during last ED visit (I reviewed w/ pt and mother- decrease analgesic use, take steroids as rx'ed ), and f/up with ailin on Monday. They understand and agree w/ plan.    Pt stable for dc home.  Pt and mother understands signs and symptoms for ED return.   ..

## 2021-10-31 NOTE — ED ADULT TRIAGE NOTE - CHIEF COMPLAINT QUOTE
headache x 1 month, now feeling light headed ness. sees dr Macias for absent seizures. prescribed prednisone with no relief.

## 2021-10-31 NOTE — ED PROVIDER NOTE - OBJECTIVE STATEMENT
10 y.o. F with PMH of absence seizures on ethosuximide and asthma, presenting with headache x1 month and lightheadedness x1 week. Patient reports intermittent R retr- orbital pain, along associated with lightheadedness and nausea. 10 y.o. F with PMH of absence seizures on ethosuximide and asthma, presenting with headache x1 month and lightheadedness x1 week. Patient reports intermittent R retr- orbital pain, along associated with lightheadedness and nausea. This is pt's 5 visit in 6 days for the same complaint. Pt's mother is frustrated by the lack of resolution of symptoms. Pt states that she is not feeling better and the headache is making her nauseous and not allowing her to sleep. pt has no other complaints and denies fevers, chills, vomiting, chest pain, abdominal pain, urinary changes. 20 y.o. F with PMH of absence seizures on ethosuximide and asthma, presenting with headache x1 month and lightheadedness x1 week. Patient reports intermittent R retr- orbital pain, along associated with lightheadedness and nausea. This is pt's 5 visit in 6 days for the same complaint. Pt's mother is frustrated by the lack of resolution of symptoms. Pt states that she is not feeling better and the headache is making her nauseous and not allowing her to sleep. pt has no other complaints and denies fevers, chills, vomiting, chest pain, abdominal pain, urinary changes.

## 2021-10-31 NOTE — ED PROVIDER NOTE - PATIENT PORTAL LINK FT
You can access the FollowMyHealth Patient Portal offered by [facility] by registering at the following website: [email or web address]. By joining [employer]’s FollowMyHealth portal, you will also be able to view your health information using other applications (apps) compatible with our system.

## 2021-11-02 ENCOUNTER — APPOINTMENT (OUTPATIENT)
Dept: PEDIATRIC NEUROLOGY | Facility: CLINIC | Age: 21
End: 2021-11-02
Payer: COMMERCIAL

## 2021-11-02 VITALS
OXYGEN SATURATION: 100 % | TEMPERATURE: 97.8 F | BODY MASS INDEX: 29.41 KG/M2 | DIASTOLIC BLOOD PRESSURE: 91 MMHG | WEIGHT: 166 LBS | HEIGHT: 63 IN | SYSTOLIC BLOOD PRESSURE: 145 MMHG | HEART RATE: 83 BPM

## 2021-11-02 DIAGNOSIS — R51.9 HEADACHE, UNSPECIFIED: ICD-10-CM

## 2021-11-02 PROCEDURE — 99214 OFFICE O/P EST MOD 30 MIN: CPT

## 2021-11-02 NOTE — ASSESSMENT
[FreeTextEntry1] : 20 year old with history of acute progressive headache. Nonfocal neurologic exam and currently headache free. Possibly Hemicrania versus neuralgia.\par \par 1. Recommend MRI brain given focality and Cervical Spine\par 2. Will start Indomethacin 25 mg q6 hours for now. Will reassess response in 2 days\par 3. Will assess need for prophylaxis depending on response to current treatment

## 2021-11-02 NOTE — REVIEW OF SYSTEMS
[Normal] : Psychiatric [FreeTextEntry3] : Scheduled for Ophthalmology exam this Thursday [FreeTextEntry7] : No change in diet. Does not drink water regularly

## 2021-11-02 NOTE — PHYSICAL EXAM
[Well-appearing] : well-appearing [Normocephalic] : normocephalic [No dysmorphic facial features] : no dysmorphic facial features [No ocular abnormalities] : no ocular abnormalities [Neck supple] : neck supple [Lungs clear] : lungs clear [Heart sounds regular in rate and rhythm] : heart sounds regular in rate and rhythm [Soft] : soft [No organomegaly] : no organomegaly [No abnormal neurocutaneous stigmata or skin lesions] : no abnormal neurocutaneous stigmata or skin lesions [Straight] : straight [No david or dimples] : no david or dimples [No deformities] : no deformities [Alert] : alert [Well related, good eye contact] : well related, good eye contact [Conversant] : conversant [Normal speech and language] : normal speech and language [Follows instructions well] : follows instructions well [VFF] : VFF [Pupils reactive to light and accommodation] : pupils reactive to light and accommodation [Full extraocular movements] : full extraocular movements [Saccadic and smooth pursuits intact] : saccadic and smooth pursuits intact [No nystagmus] : no nystagmus [No papilledema] : no papilledema [Normal facial sensation to light touch] : normal facial sensation to light touch [No facial asymmetry or weakness] : no facial asymmetry or weakness [Gross hearing intact] : gross hearing intact [Equal palate elevation] : equal palate elevation [Good shoulder shrug] : good shoulder shrug [Normal tongue movement] : normal tongue movement [Midline tongue, no fasciculations] : midline tongue, no fasciculations [Normal axial and appendicular muscle tone] : normal axial and appendicular muscle tone [Gets up on table without difficulty] : gets up on table without difficulty [No pronator drift] : no pronator drift [No abnormal involuntary movements] : no abnormal involuntary movements [5/5 strength in proximal and distal muscles of arms and legs] : 5/5 strength in proximal and distal muscles of arms and legs [Walks and runs well] : walks and runs well [Able to walk on heels] : able to walk on heels [Able to walk on toes] : able to walk on toes [2+ biceps] : 2+ biceps [Triceps] : triceps [Knee jerks] : knee jerks [Ankle jerks] : ankle jerks [No ankle clonus] : no ankle clonus [Localizes LT and temperature] : localizes LT and temperature [No dysmetria on FTNT] : no dysmetria on FTNT [Good walking balance] : good walking balance [Normal gait] : normal gait

## 2021-11-02 NOTE — HISTORY OF PRESENT ILLNESS
[FreeTextEntry1] : Nan presents for evaluation persistent headaches. She states she has had headaches "since the Summer" and they are increasing in intensity. She describes dull pain spreading from right occiput to right forehead. There is no associated vision or hearing change. Recently she has reported feeling "light headed" with headache. She recently reports nausea without emesis. There is no extremity weakness. Headache improves with Tylenol. She cannot quantify duration of headache but notes never more than one headache in a 24 hout period. Last week headache became daily. She was evaluated in [facility] and discharged home from hospital. She has returned to ER 3 times, most recently last Sunday. Head CT completed is normal. She was treated and released with 5 day course Prednisone and Reglan  for presumed Status Migranosis. She states nausea is better but headaches recur. She does not currently have headache.

## 2021-11-05 ENCOUNTER — NON-APPOINTMENT (OUTPATIENT)
Age: 21
End: 2021-11-05

## 2021-11-19 ENCOUNTER — TRANSCRIPTION ENCOUNTER (OUTPATIENT)
Age: 21
End: 2021-11-19

## 2022-01-27 RX ORDER — INDOMETHACIN 25 MG/1
25 CAPSULE ORAL EVERY 6 HOURS
Qty: 56 | Refills: 0 | Status: DISCONTINUED | COMMUNITY
Start: 2021-11-02 | End: 2022-01-27

## 2022-02-25 ENCOUNTER — APPOINTMENT (OUTPATIENT)
Dept: PEDIATRIC PULMONARY CYSTIC FIB | Facility: CLINIC | Age: 22
End: 2022-02-25
Payer: COMMERCIAL

## 2022-02-25 VITALS
SYSTOLIC BLOOD PRESSURE: 124 MMHG | HEART RATE: 93 BPM | HEIGHT: 62.76 IN | DIASTOLIC BLOOD PRESSURE: 88 MMHG | BODY MASS INDEX: 30.13 KG/M2 | WEIGHT: 167.9 LBS | OXYGEN SATURATION: 98 %

## 2022-02-25 DIAGNOSIS — J30.9 ALLERGIC RHINITIS, UNSPECIFIED: ICD-10-CM

## 2022-02-25 DIAGNOSIS — E66.9 OBESITY, UNSPECIFIED: ICD-10-CM

## 2022-02-25 DIAGNOSIS — F41.9 ANXIETY DISORDER, UNSPECIFIED: ICD-10-CM

## 2022-02-25 DIAGNOSIS — J45.909 UNSPECIFIED ASTHMA, UNCOMPLICATED: ICD-10-CM

## 2022-02-25 DIAGNOSIS — R94.2 ABNORMAL RESULTS OF PULMONARY FUNCTION STUDIES: ICD-10-CM

## 2022-02-25 DIAGNOSIS — J45.990 EXERCISE INDUCED BRONCHOSPASM: ICD-10-CM

## 2022-02-25 PROCEDURE — 95012 NITRIC OXIDE EXP GAS DETER: CPT

## 2022-02-25 PROCEDURE — 99215 OFFICE O/P EST HI 40 MIN: CPT | Mod: 25

## 2022-02-25 RX ORDER — ALBUTEROL SULFATE 90 UG/1
108 (90 BASE) INHALANT RESPIRATORY (INHALATION) EVERY 4 HOURS
Qty: 2 | Refills: 1 | Status: ACTIVE | COMMUNITY
Start: 1900-01-01 | End: 1900-01-01

## 2022-02-25 NOTE — HISTORY OF PRESENT ILLNESS
[FreeTextEntry1] : .  Patient is followed by pediatric pulmonary for\par 1 chronic asthma mild persistent\par 2 chronic rhinitis\par 3.  Exercise-induced asthma\par 4 obesity BMI BMI 97 percentile\par \par Since last seen patient symptoms has been              controlled well\par \par NEW: history of anxiety, stopped class for now; s/b therapist, recently increase cough at night \par \par \par PULMONARY HPI FOR TODAY VISIT\par \par Activity: there is  no    complaint of  activity limitation : \par \par there is improvement in coughing,          wheezing, shortness of breath\par there is no stridor, distress, loss of energy, hemoptysis, fever, night sweat, weight loss\par  \par CXR:  patient has no recent Chest X Ray , no history of pneumonia\par \par SLEEP :   No snoring, restless, daytime sleepiness, bedtime issues, \par \par \par ASTHMA HPI : Asthma symptoms not totally l controlled by Rules of Twos (day symptoms < 2 x/week; night symptoms < 2x /month, no /minimal limitations of activities, less than 2 courses of systemic steroid per 12 month, no ED visits/ hospitalization )\par \par We also discussed extensively taught to monitor symptoms especially cough, tightness, wheeze and SOB when active , laughing , strong emotion such as crying, running, exposed to cold air and at night\par \par \par D/W PATIENT AND GUARDIAN  FOR COVID and FLU VACCINE (CDC LATEST RECOMMENDATION)\par \par COVID\par \par not given, worry about long term s/e\par d/w headache\par \par  INFLUENZA VACCINE (FROM SEPTEMBER to MAY )\par \par already given\par \par \par \par \par

## 2022-02-25 NOTE — REASON FOR VISIT
[Routine Follow-Up] : a routine follow-up visit for [Asthma/RAD] : asthma/RAD [Exercise Induced Dyspnea] : exercise induced dyspnea [Rhinitis] : rhinitis [Patient] : patient [Mother] : mother [FreeTextEntry3] : History of epilepsy on medicine from Dr. Macias, this is an in person visit

## 2022-02-25 NOTE — ASSESSMENT
[FreeTextEntry1] : Stable chronic problems\par \par 1 chronic mild asthma: Continue inhaled corticosteroid\par 2 chronic allergic rhinitis continue nasal steroid Rx\par 3 exercise-induced asthma, continue preexercise inhaler albuterol\par 4 obesity  discuss weight issue, d/w [facility] guideline 5:2:1:0\par discuss diet and portioning\par  May refer nutritionist\par \par \par \par \par \par Niox 128\par discussed elevated, need to increase ICS for nextr 2 months\par d/w higher risk of significant exacerbation \par \par \par total time spent 40 minutes

## 2022-03-24 RX ORDER — ALBUTEROL SULFATE 2.5 MG/3ML
(2.5 MG/3ML) SOLUTION RESPIRATORY (INHALATION)
Qty: 1 | Refills: 1 | Status: ACTIVE | COMMUNITY
Start: 2021-01-29 | End: 1900-01-01

## 2022-05-09 ENCOUNTER — RX RENEWAL (OUTPATIENT)
Age: 22
End: 2022-05-09

## 2022-05-13 ENCOUNTER — RX RENEWAL (OUTPATIENT)
Age: 22
End: 2022-05-13

## 2022-05-18 ENCOUNTER — APPOINTMENT (OUTPATIENT)
Dept: PEDIATRIC PULMONARY CYSTIC FIB | Facility: CLINIC | Age: 22
End: 2022-05-18

## 2022-07-27 ENCOUNTER — APPOINTMENT (OUTPATIENT)
Dept: PEDIATRIC NEUROLOGY | Facility: CLINIC | Age: 22
End: 2022-07-27

## 2022-07-27 VITALS
OXYGEN SATURATION: 98 % | HEART RATE: 97 BPM | DIASTOLIC BLOOD PRESSURE: 105 MMHG | TEMPERATURE: 97.8 F | SYSTOLIC BLOOD PRESSURE: 150 MMHG | WEIGHT: 166 LBS | BODY MASS INDEX: 30.55 KG/M2 | HEIGHT: 62 IN

## 2022-07-27 VITALS — DIASTOLIC BLOOD PRESSURE: 89 MMHG | SYSTOLIC BLOOD PRESSURE: 133 MMHG

## 2022-07-27 DIAGNOSIS — Z87.898 PERSONAL HISTORY OF OTHER SPECIFIED CONDITIONS: ICD-10-CM

## 2022-07-27 PROCEDURE — 99213 OFFICE O/P EST LOW 20 MIN: CPT

## 2022-07-27 RX ORDER — SERTRALINE HYDROCHLORIDE 50 MG/1
50 TABLET, FILM COATED ORAL DAILY
Refills: 0 | Status: ACTIVE | COMMUNITY

## 2022-07-27 RX ORDER — CLINDAMYCIN HYDROCHLORIDE 150 MG/1
150 CAPSULE ORAL
Qty: 21 | Refills: 0 | Status: DISCONTINUED | COMMUNITY
Start: 2022-06-06

## 2022-07-27 NOTE — HISTORY OF PRESENT ILLNESS
[FreeTextEntry1] : Nan remains clinically seizure-free.  She is compliant with medication and not experiencing any side effects.\par \par She is no longer complaining of the previous headaches.  This seems to improve when she began treatment for anxiety

## 2022-07-27 NOTE — PHYSICAL EXAM
[Well-appearing] : well-appearing [Normocephalic] : normocephalic [No dysmorphic facial features] : no dysmorphic facial features [No ocular abnormalities] : no ocular abnormalities [Neck supple] : neck supple [Lungs clear] : lungs clear [Heart sounds regular in rate and rhythm] : heart sounds regular in rate and rhythm [Soft] : soft [No deformities] : no deformities [Alert] : alert [Well related, good eye contact] : well related, good eye contact [Conversant] : conversant [Normal speech and language] : normal speech and language [Follows instructions well] : follows instructions well [Pupils reactive to light and accommodation] : pupils reactive to light and accommodation [Full extraocular movements] : full extraocular movements [Saccadic and smooth pursuits intact] : saccadic and smooth pursuits intact [No nystagmus] : no nystagmus [Normal facial sensation to light touch] : normal facial sensation to light touch [No facial asymmetry or weakness] : no facial asymmetry or weakness [Gross hearing intact] : gross hearing intact [Good shoulder shrug] : good shoulder shrug [Normal axial and appendicular muscle tone] : normal axial and appendicular muscle tone [Gets up on table without difficulty] : gets up on table without difficulty [No abnormal involuntary movements] : no abnormal involuntary movements [5/5 strength in proximal and distal muscles of arms and legs] : 5/5 strength in proximal and distal muscles of arms and legs [Walks and runs well] : walks and runs well [2+ biceps] : 2+ biceps [Triceps] : triceps [Knee jerks] : knee jerks [Localizes LT and temperature] : localizes LT and temperature [No dysmetria on FTNT] : no dysmetria on FTNT [Good walking balance] : good walking balance [Normal gait] : normal gait [Able to tandem well] : able to tandem well [Negative Romberg] : negative Romberg

## 2022-07-27 NOTE — ASSESSMENT
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  Will check ethosuximide level, CBC and CMP\par 2.  Plan for 24-hour screening ambulatory EEG.\par 3.  If EEG remains normal and ethosuximide level is subtherapeutic I will be able to wean her from Ethosuximide\par \par

## 2022-08-17 ENCOUNTER — NON-APPOINTMENT (OUTPATIENT)
Age: 22
End: 2022-08-17

## 2022-08-19 ENCOUNTER — APPOINTMENT (OUTPATIENT)
Dept: NEUROLOGY | Facility: CLINIC | Age: 22
End: 2022-08-19

## 2022-08-19 PROCEDURE — 95819 EEG AWAKE AND ASLEEP: CPT

## 2022-08-20 PROCEDURE — 95708 EEG WO VID EA 12-26HR UNMNTR: CPT

## 2022-08-20 PROCEDURE — 95719 EEG PHYS/QHP EA INCR W/O VID: CPT

## 2023-05-10 ENCOUNTER — RX RENEWAL (OUTPATIENT)
Age: 23
End: 2023-05-10

## 2023-08-31 ENCOUNTER — RX RENEWAL (OUTPATIENT)
Age: 23
End: 2023-08-31

## 2023-10-20 NOTE — ED PROVIDER NOTE - CARE PROVIDER_API CALL
4 Eyes Skin Assessment     NAME:  Kate Barraza  YOB: 1965  MEDICAL RECORD NUMBER:  [id number]    Areli Macias)  Child Neurology; EEGEpilepsy; Pediatric Neurology  [address] [place] [address]  Phone: [phone]  Fax: [phone]  Follow Up Time: 1-3 Days

## 2024-01-30 ENCOUNTER — APPOINTMENT (OUTPATIENT)
Dept: NEUROLOGY | Facility: CLINIC | Age: 24
End: 2024-01-30
Payer: COMMERCIAL

## 2024-01-30 VITALS — WEIGHT: 165 LBS | BODY MASS INDEX: 30.36 KG/M2 | HEIGHT: 62 IN

## 2024-01-30 DIAGNOSIS — G40.309 GENERALIZED IDIOPATHIC EPILEPSY AND EPILEPTIC SYNDROMES, NOT INTRACTABLE, W/OUT STATUS EPILEPTICUS: ICD-10-CM

## 2024-01-30 PROCEDURE — 99214 OFFICE O/P EST MOD 30 MIN: CPT

## 2024-01-30 RX ORDER — ETHOSUXIMIDE 250 MG/1
250 CAPSULE, LIQUID FILLED ORAL
Qty: 270 | Refills: 2 | Status: ACTIVE | COMMUNITY
Start: 2019-11-05 | End: 1900-01-01

## 2024-01-30 NOTE — PHYSICAL EXAM
[Well-appearing] : well-appearing [Normocephalic] : normocephalic [No dysmorphic facial features] : no dysmorphic facial features [No ocular abnormalities] : no ocular abnormalities [Neck supple] : neck supple [Lungs clear] : lungs clear [Heart sounds regular in rate and rhythm] : heart sounds regular in rate and rhythm [Soft] : soft [No organomegaly] : no organomegaly [No abnormal neurocutaneous stigmata or skin lesions] : no abnormal neurocutaneous stigmata or skin lesions [No deformities] : no deformities [Alert] : alert [Well related, good eye contact] : well related, good eye contact [Conversant] : conversant [Normal speech and language] : normal speech and language [Follows instructions well] : follows instructions well [Pupils reactive to light and accommodation] : pupils reactive to light and accommodation [Full extraocular movements] : full extraocular movements [Saccadic and smooth pursuits intact] : saccadic and smooth pursuits intact [No nystagmus] : no nystagmus [Normal facial sensation to light touch] : normal facial sensation to light touch [No facial asymmetry or weakness] : no facial asymmetry or weakness [Gross hearing intact] : gross hearing intact [Equal palate elevation] : equal palate elevation [Good shoulder shrug] : good shoulder shrug [Normal tongue movement] : normal tongue movement [Midline tongue, no fasciculations] : midline tongue, no fasciculations [Normal axial and appendicular muscle tone] : normal axial and appendicular muscle tone [Gets up on table without difficulty] : gets up on table without difficulty [No abnormal involuntary movements] : no abnormal involuntary movements [5/5 strength in proximal and distal muscles of arms and legs] : 5/5 strength in proximal and distal muscles of arms and legs [Walks and runs well] : walks and runs well [2+ biceps] : 2+ biceps [Triceps] : triceps [Knee jerks] : knee jerks [Localizes LT and temperature] : localizes LT and temperature [Good walking balance] : good walking balance [Normal gait] : normal gait

## 2024-01-30 NOTE — HISTORY OF PRESENT ILLNESS
[FreeTextEntry1] : Nan presents in follow-up of her epilepsy.  Of note she was last seen almost 2 years ago.  During that time she states she has been compliant with medication and has not had any episodes suspicious for seizure.

## 2024-01-30 NOTE — ASSESSMENT
[FreeTextEntry1] : 23-year-old with history of Absence epilepsy who remains clinically seizure-free.  1.  Lab work to be sent for ethosuximide level.  Will also send CBC, CMP, thyroid panel and cholesterol panel 2.  Continue current dose of ethosuximide 3.  Will transition care at this point to adult neurology and family is in agreement

## 2024-03-11 ENCOUNTER — RX RENEWAL (OUTPATIENT)
Age: 24
End: 2024-03-11

## 2024-03-11 RX ORDER — BECLOMETHASONE DIPROPIONATE HFA 40 UG/1
40 AEROSOL, METERED RESPIRATORY (INHALATION)
Qty: 31.8 | Refills: 0 | Status: ACTIVE | COMMUNITY
Start: 2020-03-10 | End: 1900-01-01

## 2024-03-13 ENCOUNTER — RX RENEWAL (OUTPATIENT)
Age: 24
End: 2024-03-13

## 2024-03-13 RX ORDER — ALBUTEROL SULFATE 90 UG/1
108 (90 BASE) INHALANT RESPIRATORY (INHALATION) EVERY 4 HOURS
Qty: 2 | Refills: 0 | Status: ACTIVE | COMMUNITY
Start: 2020-03-10 | End: 1900-01-01

## 2024-03-26 NOTE — ED ADULT NURSE NOTE - NSHOSCREENINGQ1_ED_ALL_ED
Quality 226: Preventive Care And Screening: Tobacco Use: Screening And Cessation Intervention: Patient screened for tobacco use and is an ex/non-smoker Detail Level: Detailed Quality 176: Tuberculosis Screening Prior To First Course Of Biologic And/Or Immune Response Modifier Therapy: Patient receiving first-time biologic and/or immune response modifier therapy, TB Screening Performed and Results Interpreted within 12 months Quality 130: Documentation Of Current Medications In The Medical Record: Current Medications Documented No

## 2024-04-09 NOTE — ED ADULT TRIAGE NOTE - BEFAST ARM SIDE DRIFT
[facility]  PHYSICAL THERAPY MISSED TREATMENT NOTE  [facility] CV[facility] 4B    Date: 2024  Patient Name: Alpa Marte        MRN: [id number]   : 1955  (68 y.o.)  Gender: female   Referring Practitioner: Santino Stewart PA-C  Diagnosis: Adjacent segment disease of lumbar spine with history of fusion procedure         REASON FOR MISSED TREATMENT:  Missed Treat.  Per discussion with OT, pt shouting out in pain, was dep x 2 for rolling in bed.  Will hold.   No

## 2024-09-26 ENCOUNTER — APPOINTMENT (OUTPATIENT)
Dept: OBGYN | Facility: CLINIC | Age: 24
End: 2024-09-26
Payer: COMMERCIAL

## 2024-09-26 VITALS
HEART RATE: 120 BPM | WEIGHT: 160 LBS | DIASTOLIC BLOOD PRESSURE: 87 MMHG | SYSTOLIC BLOOD PRESSURE: 125 MMHG | HEIGHT: 62 IN | BODY MASS INDEX: 29.44 KG/M2

## 2024-09-26 DIAGNOSIS — Z87.09 PERSONAL HISTORY OF OTHER DISEASES OF THE RESPIRATORY SYSTEM: ICD-10-CM

## 2024-09-26 DIAGNOSIS — N94.6 DYSMENORRHEA, UNSPECIFIED: ICD-10-CM

## 2024-09-26 DIAGNOSIS — N92.1 EXCESSIVE AND FREQUENT MENSTRUATION WITH IRREGULAR CYCLE: ICD-10-CM

## 2024-09-26 PROCEDURE — 99459 PELVIC EXAMINATION: CPT

## 2024-09-26 PROCEDURE — 99203 OFFICE O/P NEW LOW 30 MIN: CPT

## 2024-09-26 NOTE — HISTORY OF PRESENT ILLNESS
[Y] : Patient reports abnormal menses [Excessive Bleeding] : there was excessive bleeding [Using ___ Pads Per 24 Hr] : she has been using [unfilled] pads per 24 hours [Dysmenorrhea] : dysmenorrhea [N] : Patient denies prior pregnancies [Menarche Age: ____] : age at menarche was [unfilled] [No] : Patient does not have concerns regarding sex [Never active] : never active [LMPDate] : 09/08/24 [MensesFreq] : 12-28 [MensesLength] : 6 [MensesAmount] : heavy [FreeTextEntry1] : 09/08/24

## 2024-09-26 NOTE — PHYSICAL EXAM
[Chaperone Present] : A chaperone was present in the examining room during all aspects of the physical examination [47348] : A chaperone was present during the pelvic exam. [Appropriately responsive] : appropriately responsive [Alert] : alert [No Acute Distress] : no acute distress [Soft] : soft [Non-tender] : non-tender [Non-distended] : non-distended [Oriented x3] : oriented x3 [FreeTextEntry1] : deferred, pt is virginal

## 2024-10-09 ENCOUNTER — RX RENEWAL (OUTPATIENT)
Age: 24
End: 2024-10-09

## 2024-11-05 PROBLEM — N83.292 OTHER OVARIAN CYST, LEFT SIDE: Status: ACTIVE | Noted: 2024-11-05

## 2024-11-07 ENCOUNTER — NON-APPOINTMENT (OUTPATIENT)
Age: 24
End: 2024-11-07

## 2024-11-08 ENCOUNTER — RX RENEWAL (OUTPATIENT)
Age: 24
End: 2024-11-08

## 2024-12-02 ENCOUNTER — APPOINTMENT (OUTPATIENT)
Dept: NEUROLOGY | Facility: CLINIC | Age: 24
End: 2024-12-02
Payer: COMMERCIAL

## 2024-12-02 VITALS
DIASTOLIC BLOOD PRESSURE: 77 MMHG | SYSTOLIC BLOOD PRESSURE: 129 MMHG | OXYGEN SATURATION: 99 % | RESPIRATION RATE: 18 BRPM | HEART RATE: 78 BPM | WEIGHT: 157.31 LBS | HEIGHT: 62 IN | BODY MASS INDEX: 28.95 KG/M2

## 2024-12-02 DIAGNOSIS — R56.9 UNSPECIFIED CONVULSIONS: ICD-10-CM

## 2024-12-02 PROCEDURE — 99214 OFFICE O/P EST MOD 30 MIN: CPT

## 2025-02-07 ENCOUNTER — APPOINTMENT (OUTPATIENT)
Dept: NEUROLOGY | Facility: CLINIC | Age: 25
End: 2025-02-07
Payer: COMMERCIAL

## 2025-02-07 PROCEDURE — 95816 EEG AWAKE AND DROWSY: CPT

## 2025-02-12 ENCOUNTER — APPOINTMENT (OUTPATIENT)
Age: 25
End: 2025-02-12
Payer: COMMERCIAL

## 2025-02-12 ENCOUNTER — RX RENEWAL (OUTPATIENT)
Age: 25
End: 2025-02-12

## 2025-02-12 ENCOUNTER — INPATIENT (INPATIENT)
Facility: HOSPITAL | Age: 25
LOS: 1 days | Discharge: HOME | DRG: 101 | End: 2025-02-14
Attending: INTERNAL MEDICINE | Admitting: PSYCHIATRY & NEUROLOGY
Payer: COMMERCIAL

## 2025-02-12 VITALS
SYSTOLIC BLOOD PRESSURE: 133 MMHG | OXYGEN SATURATION: 100 % | RESPIRATION RATE: 18 BRPM | DIASTOLIC BLOOD PRESSURE: 92 MMHG | WEIGHT: 151.46 LBS | HEART RATE: 103 BPM | TEMPERATURE: 98 F | HEIGHT: 63 IN

## 2025-02-12 DIAGNOSIS — G40.89 OTHER SEIZURES: ICD-10-CM

## 2025-02-12 DIAGNOSIS — G40.909 EPILEPSY, UNSPECIFIED, NOT INTRACTABLE, WITHOUT STATUS EPILEPTICUS: ICD-10-CM

## 2025-02-12 DIAGNOSIS — J45.909 UNSPECIFIED ASTHMA, UNCOMPLICATED: ICD-10-CM

## 2025-02-12 LAB
ALBUMIN SERPL ELPH-MCNC: 4.7 G/DL — SIGNIFICANT CHANGE UP (ref 3.5–5.2)
ALP SERPL-CCNC: 96 U/L — SIGNIFICANT CHANGE UP (ref 30–115)
ALT FLD-CCNC: 13 U/L — SIGNIFICANT CHANGE UP (ref 0–41)
ANION GAP SERPL CALC-SCNC: 12 MMOL/L — SIGNIFICANT CHANGE UP (ref 7–14)
AST SERPL-CCNC: 13 U/L — SIGNIFICANT CHANGE UP (ref 0–41)
BASOPHILS # BLD AUTO: 0.15 K/UL — SIGNIFICANT CHANGE UP (ref 0–0.2)
BASOPHILS NFR BLD AUTO: 1.4 % — HIGH (ref 0–1)
BILIRUB SERPL-MCNC: 0.4 MG/DL — SIGNIFICANT CHANGE UP (ref 0.2–1.2)
BUN SERPL-MCNC: 7 MG/DL — LOW (ref 10–20)
CALCIUM SERPL-MCNC: 9.9 MG/DL — SIGNIFICANT CHANGE UP (ref 8.4–10.5)
CHLORIDE SERPL-SCNC: 103 MMOL/L — SIGNIFICANT CHANGE UP (ref 98–110)
CO2 SERPL-SCNC: 26 MMOL/L — SIGNIFICANT CHANGE UP (ref 17–32)
CREAT SERPL-MCNC: 0.9 MG/DL — SIGNIFICANT CHANGE UP (ref 0.7–1.5)
EGFR: 92 ML/MIN/1.73M2 — SIGNIFICANT CHANGE UP
EOSINOPHIL # BLD AUTO: 0.33 K/UL — SIGNIFICANT CHANGE UP (ref 0–0.7)
EOSINOPHIL NFR BLD AUTO: 3.1 % — SIGNIFICANT CHANGE UP (ref 0–8)
GLUCOSE SERPL-MCNC: 78 MG/DL — SIGNIFICANT CHANGE UP (ref 70–99)
HCT VFR BLD CALC: 43 % — SIGNIFICANT CHANGE UP (ref 37–47)
HGB BLD-MCNC: 14.2 G/DL — SIGNIFICANT CHANGE UP (ref 12–16)
IMM GRANULOCYTES NFR BLD AUTO: 0.4 % — HIGH (ref 0.1–0.3)
LYMPHOCYTES # BLD AUTO: 2.29 K/UL — SIGNIFICANT CHANGE UP (ref 1.2–3.4)
LYMPHOCYTES # BLD AUTO: 21.2 % — SIGNIFICANT CHANGE UP (ref 20.5–51.1)
MAGNESIUM SERPL-MCNC: 2.3 MG/DL — SIGNIFICANT CHANGE UP (ref 1.8–2.4)
MCHC RBC-ENTMCNC: 26.6 PG — LOW (ref 27–31)
MCHC RBC-ENTMCNC: 33 G/DL — SIGNIFICANT CHANGE UP (ref 32–37)
MCV RBC AUTO: 80.7 FL — LOW (ref 81–99)
MONOCYTES # BLD AUTO: 0.83 K/UL — HIGH (ref 0.1–0.6)
MONOCYTES NFR BLD AUTO: 7.7 % — SIGNIFICANT CHANGE UP (ref 1.7–9.3)
NEUTROPHILS # BLD AUTO: 7.14 K/UL — HIGH (ref 1.4–6.5)
NEUTROPHILS NFR BLD AUTO: 66.2 % — SIGNIFICANT CHANGE UP (ref 42.2–75.2)
NRBC BLD AUTO-RTO: 0 /100 WBCS — SIGNIFICANT CHANGE UP (ref 0–0)
PLATELET # BLD AUTO: 449 K/UL — HIGH (ref 130–400)
PMV BLD: 10.3 FL — SIGNIFICANT CHANGE UP (ref 7.4–10.4)
POTASSIUM SERPL-MCNC: 4.4 MMOL/L — SIGNIFICANT CHANGE UP (ref 3.5–5)
POTASSIUM SERPL-SCNC: 4.4 MMOL/L — SIGNIFICANT CHANGE UP (ref 3.5–5)
PROT SERPL-MCNC: 7.5 G/DL — SIGNIFICANT CHANGE UP (ref 6–8)
RBC # BLD: 5.33 M/UL — SIGNIFICANT CHANGE UP (ref 4.2–5.4)
RBC # FLD: 13.8 % — SIGNIFICANT CHANGE UP (ref 11.5–14.5)
SODIUM SERPL-SCNC: 141 MMOL/L — SIGNIFICANT CHANGE UP (ref 135–146)
WBC # BLD: 10.78 K/UL — SIGNIFICANT CHANGE UP (ref 4.8–10.8)
WBC # FLD AUTO: 10.78 K/UL — SIGNIFICANT CHANGE UP (ref 4.8–10.8)

## 2025-02-12 PROCEDURE — 99221 1ST HOSP IP/OBS SF/LOW 40: CPT

## 2025-02-12 PROCEDURE — 95700 EEG CONT REC W/VID EEG TECH: CPT

## 2025-02-12 PROCEDURE — 36415 COLL VENOUS BLD VENIPUNCTURE: CPT

## 2025-02-12 PROCEDURE — 83735 ASSAY OF MAGNESIUM: CPT

## 2025-02-12 PROCEDURE — 80168 ASSAY OF ETHOSUXIMIDE: CPT

## 2025-02-12 PROCEDURE — 85025 COMPLETE CBC W/AUTO DIFF WBC: CPT

## 2025-02-12 PROCEDURE — 80053 COMPREHEN METABOLIC PANEL: CPT

## 2025-02-12 PROCEDURE — 95716 VEEG EA 12-26HR CONT MNTR: CPT

## 2025-02-12 RX ORDER — ALBUTEROL 90 MCG
1 AEROSOL REFILL (GRAM) INHALATION EVERY 4 HOURS
Refills: 0 | Status: DISCONTINUED | OUTPATIENT
Start: 2025-02-12 | End: 2025-02-14

## 2025-02-12 RX ORDER — MAGNESIUM, ALUMINUM HYDROXIDE 200-225/5
30 SUSPENSION, ORAL (FINAL DOSE FORM) ORAL EVERY 4 HOURS
Refills: 0 | Status: DISCONTINUED | OUTPATIENT
Start: 2025-02-12 | End: 2025-02-14

## 2025-02-12 RX ORDER — ETHOSUXIMIDE 250 MG/1
250 CAPSULE, LIQUID FILLED ORAL
Refills: 0 | Status: DISCONTINUED | OUTPATIENT
Start: 2025-02-12 | End: 2025-02-13

## 2025-02-12 RX ORDER — ONDANSETRON 4 MG/1
4 TABLET, ORALLY DISINTEGRATING ORAL EVERY 8 HOURS
Refills: 0 | Status: DISCONTINUED | OUTPATIENT
Start: 2025-02-12 | End: 2025-02-14

## 2025-02-12 RX ORDER — ETHOSUXIMIDE 250 MG/1
250 CAPSULE, LIQUID FILLED ORAL
Refills: 0 | Status: DISCONTINUED | OUTPATIENT
Start: 2025-02-12 | End: 2025-02-14

## 2025-02-12 RX ORDER — ACETAMINOPHEN 160 MG/5ML
650 SUSPENSION ORAL EVERY 6 HOURS
Refills: 0 | Status: DISCONTINUED | OUTPATIENT
Start: 2025-02-12 | End: 2025-02-14

## 2025-02-12 RX ADMIN — ETHOSUXIMIDE 500 MILLIGRAM(S): 250 CAPSULE, LIQUID FILLED ORAL at 21:16

## 2025-02-12 NOTE — H&P ADULT - NS ATTEND AMEND GEN_ALL_CORE FT
pt seen and examined  dicsussed with pt , bf, and neuro  elective veeg admisison  attempt to wean down on AEDs  no symptoms at this time  follow up neuro recs in am  check cbc,bmp, mg  seizure precautions

## 2025-02-12 NOTE — H&P ADULT - HISTORY OF PRESENT ILLNESS
25 yo female with PMhx Petite Mal Seizures and Asthma presents for VEEG. Pt was diagnosed with seizures at 8 y/o and has been treated with Ethosuximide since then. Pt has been very stable without any breakthrough seizures. Last known seizure was at 12yo. Pt presents for VEEG to aid in weaning off meds.     Pt is without any physical compaints at this time.

## 2025-02-12 NOTE — H&P ADULT - ASSESSMENT
25 yo female admitted for VEEG        Plan:  1. Seizure Disorder  - VEEG to evaluate medication weaning  - Seizure precautions  - cont Meds per Neurology  - check labs  - keep Mg >2    2. Asthma - not in exacerbation  - Albuterol PRN    3. DVT Proph  - SCDs while in bed      All above D/W Kristen

## 2025-02-12 NOTE — CONSULT NOTE ADULT - NS ATTEND AMEND GEN_ALL_CORE FT
Agree with history and physical exam.  Patient is here for risk assessment based on the history suggestive of PGE, most likely age-related.  No clinical events for over 10 years on ethosuximide.   Will start VEEG monitoring for further characterization and long-term planning.  Seizure precautions.  Continue current dose of ethosuximide for now, send trough level.

## 2025-02-12 NOTE — CONSULT NOTE ADULT - SUBJECTIVE AND OBJECTIVE BOX
Neurology/Epilepsy Consult:    MERLENE FOY 24y Female  MRN-[id number]    Patient is a 24y old right-handed Female who presents for elective VEEG         HPI: History obtained from patient and mother. EMR and outpatient records reviewed.          PAST MEDICAL & SURGICAL HISTORY:  Epilepsy  Asthma        FAMILY HISTORY:  No seizures        Social History:  Lives with mother and siblings  Received associate degree  Works FT as dental assistant  Denies ETOH/recreational drug use  Never smoked        Risk factors:  Born full-term, , no complications  Normal growth and development  No febrile seizures/CNS infections  No head trauma with loss of consciousness        Allergy:  shellfish   penicillin         Home Medications:  Albuterol (Eqv-ProAir HFA) 90 mcg/inh inhalation aerosol:  (26 Oct 2021 06:38)  ethosuximide: 250 milligram(s) capsules: 1 cap in the morning and 2 caps at bedtime        MEDICATIONS  (STANDING):  ethosuximide 250 milliGRAM(s) Oral <User Schedule>  ethosuximide 500 milliGRAM(s) Oral <User Schedule>    MEDICATIONS  (PRN):  acetaminophen     Tablet .. 650 milliGRAM(s) Oral every 6 hours PRN Temp greater or equal to 38C (100.4F), Mild Pain (1 - 3)  albuterol    90 MICROgram(s) HFA Inhaler 1 Puff(s) Inhalation every 4 hours PRN Shortness of Breath and/or Wheezing  aluminum hydroxide/magnesium hydroxide/simethicone Suspension 30 milliLiter(s) Oral every 4 hours PRN Dyspepsia  LORazepam   Injectable 2 milliGRAM(s) IV Push three times a day PRN generalized tonic-clonic seizure lasting longer than 2 minutes, or two consecutive seizures without return to baseline in-between  ondansetron Injectable 4 milliGRAM(s) IV Push every 8 hours PRN Nausea and/or Vomiting          T(F): 97.6 (25 @ 10:19), Max: 97.6 (25 @ 10:19)  HR: 103 (25 @ 10:19) (103 - 103)  BP: 133/92 (25 @ 10:19) (133/92 - 133/92)  RR: 18 (25 @ 10:19) (18 - 18)  SpO2: 100% (25 @ 10:19) (100% - 100%)        Neurologic Examination:  General:  Appearance is consistent with chronologic age.  No abnormal facies.   General: The patient is oriented to person, place, time and date.  Recent and remote memory intact.  Follows 4-step directions. Fund of knowledge is intact and normal.  Language with normal repetition, comprehension and naming.  Nondysarthric.    Cranial nerves: EOMI w/o nystagmus, skew or reported double vision.  PERRL.  No ptosis/weakness of eyelid closure.  Facial sensation is normal with normal bite.  No facial asymmetry.  Hearing grossly intact b/l.  Palate elevates midline.  Tongue midline.  Motor examination:   Normal tone, bulk and range of motion.  No tenderness, twitching, tremors or involuntary movements.  Formal Muscle Strength Testin/5 UE; 5/5 LE.  No observable drift.  Reflexes:   2+ b/l pectoralis, biceps, triceps, brachioradialis, patella and Achilles.  Plantar response downgoing b/l.  Jaw jerk, Emir, clonus absent.  Sensory examination:   Intact to light touch and pinprick, pain, temperature and proprioception and vibration in all extremities.  Cerebellum:   FTN/HKS intact with normal LEONARDO in all limbs.  No dysmetria or dysdiadokinesia.  Gait narrow based and normal.        Labs:                                 Neuroimaging:  CT Head:   CT Angiography/Perfusion:   MRI Brain:   MRA Head/Neck:     Carotid US:         EEG:         Assessment:  This is a 24y Female with h/o         Discussed with Dr. Mercer        Plan:   - VEEG monitoring for better characterization and assessment  - Seizure precautions  - Ativan 2mg IV PRN for generalized tonic-clonic seizure lasting longer than 2 minutes, or two consecutive seizures without return to baseline in-between (ordered)  - CBC, CMP, Mg, CK, ASM levels trough (ordered)  - Keep Mg above 2    Plan discussed with patient in details, all questions answered.    Discussed with nursing team, hospitalist, and PA. Neurology/Epilepsy Consult:    MERLENE FOY 24y Female  MRN-[id number]    Patient is a 24y old right-handed Female who presents for elective VEEG         HPI: History obtained from patient and mother. EMR and outpatient records reviewed.  At 10 yo patient started complaining of headaches and was noted to have staring spells. She was evaluated by a pediatric neurologist Dr. Macias, admitted for VEEG (see report below). Patient was started on Ethosuximide that she continues taking. Per patient and mother, do not report any episodes suspicious for seizures for over 10 years. Patient never had generalized seizures. She is compliant with medication, tolerating well.  Recently patient transitioned to adult neurologist and was evaluated by Dr. Mercer.        PAST MEDICAL & SURGICAL HISTORY:  Epilepsy  Asthma        FAMILY HISTORY:  Sister - febrile seizure x 1        Social History:  Lives with mother and siblings  Received associate degree  Works FT as dental assistant  Denies ETOH/recreational drug use  Never smoked        Risk factors:  Born full-term, , no complications  Normal growth and development  No febrile seizures/CNS infections  No head trauma with loss of consciousness        Allergy:  shellfish   penicillin         Home Medications:  Albuterol (Eqv-ProAir HFA) 90 mcg/inh inhalation aerosol:  (26 Oct 2021 06:38)  ethosuximide: 250 milligram(s) capsules: 1 cap in the morning and 2 caps at bedtime        MEDICATIONS  (STANDING):  ethosuximide 250 milliGRAM(s) Oral <User Schedule>  ethosuximide 500 milliGRAM(s) Oral <User Schedule>    MEDICATIONS  (PRN):  acetaminophen     Tablet .. 650 milliGRAM(s) Oral every 6 hours PRN Temp greater or equal to 38C (100.4F), Mild Pain (1 - 3)  albuterol    90 MICROgram(s) HFA Inhaler 1 Puff(s) Inhalation every 4 hours PRN Shortness of Breath and/or Wheezing  aluminum hydroxide/magnesium hydroxide/simethicone Suspension 30 milliLiter(s) Oral every 4 hours PRN Dyspepsia  LORazepam   Injectable 2 milliGRAM(s) IV Push three times a day PRN generalized tonic-clonic seizure lasting longer than 2 minutes, or two consecutive seizures without return to baseline in-between  ondansetron Injectable 4 milliGRAM(s) IV Push every 8 hours PRN Nausea and/or Vomiting          T(F): 97.6 (25 @ 10:19), Max: 97.6 (25 @ 10:19)  HR: 103 (25 @ 10:19) (103 - 103)  BP: 133/92 (25 @ 10:19) (133/92 - 133/92)  RR: 18 (25 @ 10:19) (18 - 18)  SpO2: 100% (25 @ 10:19) (100% - 100%)        Neurologic Examination:  General:  Appearance is consistent with chronologic age.  No abnormal facies.   General: The patient is oriented to person, place, time and date.  Recent and remote memory intact.  Follows 4-step directions. Fund of knowledge is intact and normal.  Language with normal repetition, comprehension and naming.  Nondysarthric.    Cranial nerves: EOMI w/o nystagmus, skew or reported double vision.  PERRL.  No ptosis/weakness of eyelid closure.  Facial sensation is normal with normal bite.  No facial asymmetry.  Hearing grossly intact b/l.  Palate elevates midline.  Tongue midline.  Motor examination:   Normal tone, bulk and range of motion.  No tenderness, twitching, tremors or involuntary movements.  Formal Muscle Strength Testin/5 UE; 5/5 LE.  No observable drift.  Reflexes:   2+ b/l pectoralis, biceps, triceps, brachioradialis, patella and Achilles.  Plantar response downgoing b/l.  Jaw jerk, Emir, clonus absent.  Sensory examination:   Intact to light touch and pinprick, pain, temperature and proprioception and vibration in all extremities.  Cerebellum:   FTN/HKS intact with normal LEONARDO in all limbs.  No dysmetria or dysdiadokinesia.  Gait narrow based and normal.            Neuroimaging:  MRI Brain 2021 at [facility] - unremarkable          AEEG  - 2022 - normal  AEEG  - 2019 - normal  REEG 2017 - normal  AEEG   - 2015 - normal  AEEG  - 2014 - normal  REEG 2014 - normal  VEEG  - 2013 - infrequent generalized epileptiform discharges, some associated with eyes fluttering lasting up to 4 seconds.  AEEG  - 2012 - normal  AEEG  - 2011 - normal  AEEG  - 9/3/2010 - normal  VEEG 12/10 - 2009 - staring episodes correlated with 3 Hz generalized polyspike wave discharges. Frequent generalized spike and polyspike wave discharges present during sleep.        Assessment:  This is a 24y Female with h/o asthma, and generalized epilepsy, remains clinically seizure-free for over 10 years on Ethosuximide.         Discussed with Dr. Mercer        Plan:   - VEEG monitoring for better characterization and treatment plan  - Seizure precautions  - Continue pre-admission dose of seizure medications (NFF completed, discussed with pharmacy, may use patient's own supply if not available)  - Ativan 2mg IV PRN for generalized tonic-clonic seizure lasting longer than 2 minutes, or two consecutive seizures without return to baseline in-between (ordered)  - CBC, CMP, Mg, ASM level trough (ordered)  - Keep Mg above 2    Plan discussed with patient and mother in details, all questions answered.    Discussed with medical and nursing teams. Neurology/Epilepsy Consult:    MERLENE FOY 24y Female  MRN-[id number]    Patient is a 24y old right-handed Female who presents for elective VEEG         HPI: History obtained from patient and mother. EMR and outpatient records reviewed.  At 10 yo patient started complaining of headaches and was noted to have staring spells. She was evaluated by a pediatric neurologist Dr. Macias, admitted for VEEG (see report below). Patient was started on Ethosuximide that she continues taking.   Patient and mother deny any episodes suspicious for seizures for over 10 years. Patient never had generalized seizures. She is compliant with medication, tolerating well.  Recently patient transitioned to adult neurologist and was evaluated by Dr. Mercer.        PAST MEDICAL & SURGICAL HISTORY:  Epilepsy  Asthma  Anxiety        FAMILY HISTORY:  Sister - febrile seizure x 1        Social History:  Lives with mother and siblings  Received associate degree  Works FT as dental assistant  Denies ETOH/recreational drug use  Never smoked        Risk factors:  Born full-term, , no complications  Normal growth and development  No febrile seizures/CNS infections  No head trauma with loss of consciousness        Allergy:  shellfish   penicillin         Home Medications:  Albuterol (Eqv-ProAir HFA) 90 mcg/inh inhalation aerosol:  (26 Oct 2021 06:38)  ethosuximide: 250 milligram(s) capsules: 1 cap in the morning and 2 caps at bedtime        MEDICATIONS  (STANDING):  ethosuximide 250 milliGRAM(s) Oral <User Schedule>  ethosuximide 500 milliGRAM(s) Oral <User Schedule>    MEDICATIONS  (PRN):  acetaminophen     Tablet .. 650 milliGRAM(s) Oral every 6 hours PRN Temp greater or equal to 38C (100.4F), Mild Pain (1 - 3)  albuterol    90 MICROgram(s) HFA Inhaler 1 Puff(s) Inhalation every 4 hours PRN Shortness of Breath and/or Wheezing  aluminum hydroxide/magnesium hydroxide/simethicone Suspension 30 milliLiter(s) Oral every 4 hours PRN Dyspepsia  LORazepam   Injectable 2 milliGRAM(s) IV Push three times a day PRN generalized tonic-clonic seizure lasting longer than 2 minutes, or two consecutive seizures without return to baseline in-between  ondansetron Injectable 4 milliGRAM(s) IV Push every 8 hours PRN Nausea and/or Vomiting          T(F): 97.6 (25 @ 10:19), Max: 97.6 (25 @ 10:19)  HR: 103 (25 @ 10:19) (103 - 103)  BP: 133/92 (25 @ 10:19) (133/92 - 133/92)  RR: 18 (25 @ 10:19) (18 - 18)  SpO2: 100% (25 @ 10:19) (100% - 100%)        Neurologic Examination:  General:  Appearance is consistent with chronologic age.  No abnormal facies.   General: The patient is oriented to person, place, time and date.  Recent and remote memory intact.  Follows 4-step directions. Fund of knowledge is intact and normal.  Language with normal repetition, comprehension and naming.  Nondysarthric.    Cranial nerves: EOMI w/o nystagmus, skew or reported double vision.  PERRL.  No ptosis/weakness of eyelid closure.  Facial sensation is normal with normal bite.  No facial asymmetry.  Hearing grossly intact b/l.  Palate elevates midline.  Tongue midline.  Motor examination:   Normal tone, bulk and range of motion.  No tenderness, twitching, tremors or involuntary movements.  Formal Muscle Strength Testin/5 UE; 5/5 LE.  No observable drift.  Reflexes:   2+ b/l pectoralis, biceps, triceps, brachioradialis, patella and Achilles.  Plantar response downgoing b/l.  Jaw jerk, Emir, clonus absent.  Sensory examination:   Intact to light touch and pinprick, pain, temperature and proprioception and vibration in all extremities.  Cerebellum:   FTN/HKS intact with normal LEONARDO in all limbs.  No dysmetria or dysdiadokinesia.  Gait narrow based and normal.            Neuroimaging:  MRI Brain 2021 at [facility] - unremarkable          AEEG  - 2022 - normal  AEEG  - 2019 - normal  REEG 2017 - normal  AEEG   - 2015 - normal  AEEG  - 2014 - normal  REEG 2014 - normal  VEEG  - 2013 - infrequent generalized epileptiform discharges, some associated with eyes fluttering lasting up to 4 seconds.  AEEG  - 2012 - normal  AEEG  - 2011 - normal  AEEG  - 9/3/2010 - normal  VEEG 12/10 - 2009 - staring episodes correlated with 3 Hz generalized polyspike wave discharges. Frequent generalized spike and polyspike wave discharges present during sleep.        Assessment:  This is a 24y Female with h/o asthma, anxiety, and generalized epilepsy, remains clinically seizure-free for over 10 years on Ethosuximide.         Discussed with Dr. Mercer        Plan:   - VEEG monitoring for better characterization and treatment plan  - Seizure precautions  - Continue pre-admission dose of seizure medications (NFF completed, discussed with pharmacy, may use patient's own supply if not available)  - Ativan 2mg IV PRN for generalized tonic-clonic seizure lasting longer than 2 minutes, or two consecutive seizures without return to baseline in-between (ordered)  - CBC, CMP, Mg, ASM level trough (ordered)  - Keep Mg above 2    Plan discussed with patient and mother in details, all questions answered.    Discussed with medical and nursing teams. Neurology/Epilepsy Consult:    MERLENE FOY 24y Female  MRN-[id number]    Patient is a 24y old right-handed Female who presents for elective VEEG         HPI: History obtained from patient and mother. EMR and outpatient records reviewed.  At 8 yo patient started complaining of headaches and was noted to have staring spells. She was evaluated by a pediatric neurologist Dr. Macias, admitted for VEEG (see report below). Patient was started on Ethosuximide that she continues taking.   Patient and mother deny any episodes suspicious for seizures for over 10 years. Patient never had generalized seizures. She is compliant with medication, tolerating well.  Recently patient transitioned to adult neurologist and was evaluated by Dr. Mercer.        PAST MEDICAL & SURGICAL HISTORY:  Epilepsy  Asthma  Anxiety        FAMILY HISTORY:  Sister - febrile seizure x 1        Social History:  Lives with mother and siblings  Received associate degree  Works FT as dental assistant  Denies ETOH/recreational drug use  Never smoked        Risk factors:  Born full-term, , no complications  Normal growth and development  No febrile seizures/CNS infections  No head trauma with loss of consciousness        Allergy:  shellfish   penicillin         Home Medications:  Albuterol (Eqv-ProAir HFA) 90 mcg/inh inhalation aerosol:  (26 Oct 2021 06:38)  ethosuximide: 250 milligram(s) capsules: 1 cap in the morning and 2 caps at bedtime        MEDICATIONS  (STANDING):  ethosuximide 250 milliGRAM(s) Oral <User Schedule>  ethosuximide 500 milliGRAM(s) Oral <User Schedule>    MEDICATIONS  (PRN):  acetaminophen     Tablet .. 650 milliGRAM(s) Oral every 6 hours PRN Temp greater or equal to 38C (100.4F), Mild Pain (1 - 3)  albuterol    90 MICROgram(s) HFA Inhaler 1 Puff(s) Inhalation every 4 hours PRN Shortness of Breath and/or Wheezing  aluminum hydroxide/magnesium hydroxide/simethicone Suspension 30 milliLiter(s) Oral every 4 hours PRN Dyspepsia  LORazepam   Injectable 2 milliGRAM(s) IV Push three times a day PRN generalized tonic-clonic seizure lasting longer than 2 minutes, or two consecutive seizures without return to baseline in-between  ondansetron Injectable 4 milliGRAM(s) IV Push every 8 hours PRN Nausea and/or Vomiting          T(F): 97.6 (25 @ 10:19), Max: 97.6 (25 @ 10:19)  HR: 103 (25 @ 10:19) (103 - 103)  BP: 133/92 (25 @ 10:19) (133/92 - 133/92)  RR: 18 (25 @ 10:19) (18 - 18)  SpO2: 100% (25 @ 10:19) (100% - 100%)        Neurologic Examination:  General:  Appearance is consistent with chronologic age.  No abnormal facies.   General: The patient is oriented to person, place, time and date.  Recent and remote memory intact.  Follows 4-step directions. Fund of knowledge is intact and normal.  Language with normal repetition, comprehension and naming.  Nondysarthric.    Cranial nerves: EOMI w/o nystagmus, skew or reported double vision.  PERRL.  No ptosis/weakness of eyelid closure.  Facial sensation is normal with normal bite.  No facial asymmetry.  Hearing grossly intact b/l.  Palate elevates midline.  Tongue midline.  Motor examination:   Normal tone, bulk and range of motion.  No tenderness, twitching, tremors or involuntary movements.  Formal Muscle Strength Testin/5 UE; 5/5 LE.  No observable drift.  Reflexes:   2+ b/l   Sensory examination:   Intact to light touch and pinprick, pain.  Cerebellum:   FTN/HKS intact with normal LEONARDO in all limbs.  No dysmetria or dysdiadokinesia.  Gait narrow based and normal.            Neuroimaging:  MRI Brain 2021 at [facility] - unremarkable          AEEG  - 2022 - normal  AEEG  - 2019 - normal  REEG 2017 - normal  AEEG   - 2015 - normal  AEEG  - 2014 - normal  REEG 2014 - normal  VEEG  - 2013 - infrequent generalized epileptiform discharges, some associated with eyes fluttering lasting up to 4 seconds.  AEEG  - 2012 - normal  AEEG  - 2011 - normal  AEEG  - 9/3/2010 - normal  VEEG 12/10 - 2009 - staring episodes correlated with 3 Hz generalized polyspike wave discharges. Frequent generalized spike and polyspike wave discharges present during sleep.        Assessment:  This is a 24y Female with h/o asthma, anxiety, and generalized epilepsy, remains clinically seizure-free for over 10 years on Ethosuximide.         Discussed with Dr. Mercer        Plan:   - VEEG monitoring for better characterization and treatment plan  - Seizure precautions  - Continue pre-admission dose of seizure medications (NFF completed, discussed with pharmacy, may use patient's own supply if not available)  - Ativan 2mg IV PRN for generalized tonic-clonic seizure lasting longer than 2 minutes, or two consecutive seizures without return to baseline in-between (ordered)  - CBC, CMP, Mg, ASM level trough (ordered)  - Keep Mg above 2    Plan discussed with patient and mother in details, all questions answered.    Discussed with medical and nursing teams.

## 2025-02-13 PROCEDURE — 95720 EEG PHY/QHP EA INCR W/VEEG: CPT

## 2025-02-13 PROCEDURE — 99231 SBSQ HOSP IP/OBS SF/LOW 25: CPT

## 2025-02-13 PROCEDURE — 99232 SBSQ HOSP IP/OBS MODERATE 35: CPT

## 2025-02-13 RX ORDER — IPRATROPIUM BROMIDE AND ALBUTEROL SULFATE .5; 2.5 MG/3ML; MG/3ML
3 SOLUTION RESPIRATORY (INHALATION) EVERY 4 HOURS
Refills: 0 | Status: DISCONTINUED | OUTPATIENT
Start: 2025-02-13 | End: 2025-02-14

## 2025-02-13 RX ADMIN — ETHOSUXIMIDE 250 MILLIGRAM(S): 250 CAPSULE, LIQUID FILLED ORAL at 21:09

## 2025-02-13 NOTE — PROGRESS NOTE ADULT - ASSESSMENT
#elective veeg  #seizure dz - on ethosuximide  - plan to wean down on meds  - seizure precautions  - discussed with Dr MYERS neuro - cont monitoring    VEEG in the last 24 hours:    Background - continuous, symmetrical, well organized, reaching frequencies in the range of 8-9 Hz, showing good reactivity and normal sleep patterns. Symmetrical response to photic stimulation.    Focal and generalized slowing - none    Interictal activity - none    Events - none    Seizures - none    Impression: Normal VEEG x 24 hrs    Plan -   Will continue monitoring  Seizure precautions  Decrease Zarontin to 250mg QHS

## 2025-02-14 ENCOUNTER — TRANSCRIPTION ENCOUNTER (OUTPATIENT)
Age: 25
End: 2025-02-14

## 2025-02-14 VITALS
RESPIRATION RATE: 18 BRPM | HEART RATE: 108 BPM | SYSTOLIC BLOOD PRESSURE: 95 MMHG | DIASTOLIC BLOOD PRESSURE: 62 MMHG | OXYGEN SATURATION: 100 % | TEMPERATURE: 98 F

## 2025-02-14 PROCEDURE — 99239 HOSP IP/OBS DSCHRG MGMT >30: CPT

## 2025-02-14 PROCEDURE — 99231 SBSQ HOSP IP/OBS SF/LOW 25: CPT

## 2025-02-14 PROCEDURE — 95720 EEG PHY/QHP EA INCR W/VEEG: CPT

## 2025-02-14 RX ORDER — ETHOSUXIMIDE 250 MG/1
1 CAPSULE, LIQUID FILLED ORAL
Qty: 0 | Refills: 0 | DISCHARGE
Start: 2025-02-14

## 2025-02-14 RX ORDER — ETHOSUXIMIDE 250 MG/1
250 CAPSULE, LIQUID FILLED ORAL ONCE
Refills: 0 | Status: COMPLETED | OUTPATIENT
Start: 2025-02-14 | End: 2025-02-14

## 2025-02-14 RX ADMIN — ETHOSUXIMIDE 250 MILLIGRAM(S): 250 CAPSULE, LIQUID FILLED ORAL at 12:28

## 2025-02-14 NOTE — DISCHARGE NOTE PROVIDER - CARE PROVIDER_API CALL
Case Mercer  Neurology  [address] [place] [address], [address] [address]  Phone: [phone]  Fax: [phone]  Established Patient  Follow Up Time: 1 week

## 2025-02-14 NOTE — DISCHARGE NOTE PROVIDER - TIME SPENT: (MINUTES SPENT ON THE DISCHARGE SERVICE)
50 Finasteride Pregnancy And Lactation Text: This medication is absolutely contraindicated during pregnancy. It is unknown if it is excreted in breast milk.

## 2025-02-14 NOTE — DISCHARGE NOTE PROVIDER - NSDCMRMEDTOKEN_GEN_ALL_CORE_FT
Albuterol (Eqv-ProAir HFA) 90 mcg/inh inhalation aerosol:   ethosuximide: 250 milligram(s) orally once a day  ethosuximide 250 mg oral capsule: 2 cap(s) orally once a day (at bedtime)   Albuterol (Eqv-ProAir HFA) 90 mcg/inh inhalation aerosol:   ethosuximide 250 mg oral capsule: 1 cap(s) orally 2 times a day for one week, then decrease to one tablet daily for one week, then stop

## 2025-02-14 NOTE — DISCHARGE NOTE NURSING/CASE MANAGEMENT/SOCIAL WORK - PATIENT PORTAL LINK FT
You can access the FollowMyHealth Patient Portal offered by [facility] by registering at the following website: [email or web address]. By joining [facility]’s FollowMyHealth portal, you will also be able to view your health information using other applications (apps) compatible with our system.

## 2025-02-14 NOTE — DISCHARGE NOTE PROVIDER - HOSPITAL COURSE
Patient is a 25 yo female admitted for VEEG    Plan:  1. Seizure Disorder  - VEEG to evaluate medication weaning  - Seizure precautions  - cont Meds per Neurology  - check labs  - keep Mg >2  - Decrease Zarontin to 250mg QHS, per neuro.  +++++++++++++++++++++++++++++++++++++++++++++++++++++++++++++++++++++++  VEEG in the last 24 hours:    Background - continuous, symmetrical, well organized, reaching frequencies in the range of 8-9 Hz, showing good reactivity and normal sleep patterns. Symmetrical response to photic stimulation.    Focal and generalized slowing - none    Interictal activity - none    Events - none    Seizures - none    Impression: Normal VEEG x 24 hrs  +++++++++++++++++++++++++++++++++++++++++++++++++++++++++++++++++++++++++    2. Asthma - not in exacerbation  - Albuterol PRN  - Continue with inhalers    3. DVT Proph  - SCDs while in bed Patient is a 25 yo female admitted for VEEG    Plan:  1. Seizure Disorder  - VEEG to evaluate medication weaning  - Seizure precautions  - cont Meds per Neurology  - check labs  - keep Mg >2  - Decrease Zarontin to 250mg QHS, per neuro.   Discharge on tapering dose of Zarontin: 250mg q12hrs x 1 week, then 250mg QHS x 1 week, then stop  +++++++++++++++++++++++++++++++++++++++++++++++++++++++++++++++++++++++  VEEG in the last 24 hours:    Background - continuous, symmetrical, well organized, reaching frequencies in the range of 8-9 Hz, showing good reactivity and normal sleep patterns. Symmetrical response to photic stimulation.    Focal and generalized slowing - none    Interictal activity - none    Events - none    Seizures - none    Impression: Normal VEEG x 24 hrs  +++++++++++++++++++++++++++++++++++++++++++++++++++++++++++++++++++++++++    2. Asthma - not in exacerbation  - Albuterol PRN  - Continue with inhalers    3. DVT Proph  - SCDs while in bed      Discharge on tapering dose of Zarontin: 250mg q12hrs x 1 week, then 250mg QHS x 1 week, then stop  Follow up in 6 months with KAPIL Patient is a 25 yo female admitted for VEEG    Plan:  1. Seizure Disorder  - VEEG to evaluate medication weaning  - Seizure precautions  - cont Meds per Neurology  - check labs  - keep Mg >2  - Decrease Zarontin to 250mg QHS, per neuro.   Discharge on tapering dose of Zarontin: 250mg q12hrs x 1 week, then 250mg QHS x 1 week, then stop  VEEG in the last 24 hours:    Background - continuous, symmetrical, well organized, reaching frequencies in the range of 8-9 Hz, showing good reactivity and normal sleep patterns. Symmetrical response to photic stimulation.    Focal and generalized slowing - none    Interictal activity - none    Events - none    Seizures - none    Impression: Normal VEEG x 24 hrs  +++++++++++++++++++++++++++++++++++++++++++++++++++++++++++++++++++++++++    2. Asthma - not in exacerbation  - Albuterol PRN  - Continue with inhalers    3. DVT Proph  - SCDs while in bed      Discharge on tapering dose of Zarontin: 250mg q12hrs x 1 week, then 250mg QHS x 1 week, then stop  Follow up in 6 months with NISAG

## 2025-02-14 NOTE — DISCHARGE NOTE PROVIDER - NSDCCPCAREPLAN_GEN_ALL_CORE_FT
PRINCIPAL DISCHARGE DIAGNOSIS  Diagnosis: Seizure disorder  Assessment and Plan of Treatment: You had a VEEG done. You were seen by the neurologist Dr. Lauren that reviewed the VEEG and the results were normal over the course of 24hrs.  Your blood work was normal as well.  The neurologist had recommended to take 1 tablet of Zarontin 250mg at bedtime every night.   Please take as prescribed and follow up with the neurologist within 1 week.

## 2025-02-14 NOTE — CHART NOTE - NSCHARTNOTEFT_GEN_A_CORE
To Whom It May Concern:    Please, be advised that Nan Escalera was admitted to [facility] 2/12 - 2/14/2025.  Nan is cleared to return to work starting 2/17/2025.

## 2025-02-14 NOTE — PROGRESS NOTE ADULT - SUBJECTIVE AND OBJECTIVE BOX
Epilepsy Attending Note:     MERLENE FOY    24y Female  MRN [id number]    Vital Signs Last 24 Hrs  T(C): 36.4 (14 Feb 2025 04:28), Max: 36.8 (13 Feb 2025 21:00)  T(F): 97.6 (14 Feb 2025 04:28), Max: 98.2 (13 Feb 2025 21:00)  HR: 108 (14 Feb 2025 04:28) (91 - 108)  BP: 95/62 (14 Feb 2025 04:28) (95/62 - 130/81)  BP(mean): --  RR: 18 (14 Feb 2025 04:28) (16 - 18)  SpO2: 100% (14 Feb 2025 04:28) (100% - 100%)    Parameters below as of 14 Feb 2025 04:28  Patient On (Oxygen Delivery Method): room air                              14.2   10.78 )-----------( 449      ( 12 Feb 2025 19:47 )             43.0       02-12    141  |  103  |  7[L]  ----------------------------<  78  4.4   |  26  |  0.9    Ca    9.9      12 Feb 2025 19:47  Mg     2.3     02-12    TPro  7.5  /  Alb  4.7  /  TBili  0.4  /  DBili  x   /  AST  13  /  ALT  13  /  AlkPhos  96  02-12      MEDICATIONS  (STANDING):  ethosuximide 250 milliGRAM(s) Oral <User Schedule>    MEDICATIONS  (PRN):  acetaminophen     Tablet .. 650 milliGRAM(s) Oral every 6 hours PRN Temp greater or equal to 38C (100.4F), Mild Pain (1 - 3)  albuterol    90 MICROgram(s) HFA Inhaler 1 Puff(s) Inhalation every 4 hours PRN Shortness of Breath and/or Wheezing  albuterol/ipratropium for Nebulization 3 milliLiter(s) Nebulizer every 4 hours PRN Shortness of Breath and/or Wheezing  aluminum hydroxide/magnesium hydroxide/simethicone Suspension 30 milliLiter(s) Oral every 4 hours PRN Dyspepsia  LORazepam   Injectable 2 milliGRAM(s) IV Push three times a day PRN generalized tonic-clonic seizure lasting longer than 2 minutes, or two consecutive seizures without return to baseline in-between  ondansetron Injectable 4 milliGRAM(s) IV Push every 8 hours PRN Nausea and/or Vomiting            VEEG in the last 24 hours:    Background - continuous, symmetrical, well organized, reaching frequencies in the range of 8-9 Hz, showing good reactivity and normal sleep patterns.     Focal and generalized slowing - none    Interictal activity - none    Events - none    Seizures - none    Impression: Normal VEEG x 48 hrs    Plan -   Will discontinue monitoring  Discharge on tapering dose of Zarontin: 250mg q12hrs x 1 week, then 250mg QHS x 1 week, then stop  Follow up in 6 months with REEG    
    MERLENE FOY  24y, Female  Allergy: shellfish (Other (Moderate))  penicillin (Unknown)      CHIEF COMPLAINT: VEEG (13 Feb 2025 13:21)      HPI:  25 yo female with PMhx [place] Seizures and Asthma presents for VEEG. Pt was diagnosed with seizures at 10 y/o and has been treated with Ethosuximide since then. Pt has been very stable without any breakthrough seizures. Last known seizure was at 12yo. Pt presents for VEEG to aid in weaning off meds.     Pt is without any physical compaints at this time.  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)      Home Medications:  Albuterol (Eqv-ProAir HFA) 90 mcg/inh inhalation aerosol:  (26 Oct 2021 06:38)  ethosuximide: 250 milligram(s) orally once a day (26 Oct 2021 06:38)  ethosuximide 250 mg oral capsule: 2 cap(s) orally once a day (at bedtime) (12 Feb 2025 10:30)      ROS  General: Denies fevers, chills, nightsweats, weight loss  HEENT: Denies headache, rhinorrhea, sore throat, eye pain  CV: Denies CP, palpitations  PULM: Denies SOB, cough  GI: Denies abdominal pain, diarrhea  : Denies dysuria, hematuria  MSK: Denies arthralgias  SKIN: Denies rash   NEURO: Denies paresthesias, weakness  PSYCH: Denies depression    VITALS:  T(F): 98, Max: 98 (02-12-25 @ 20:32)  HR: 71  BP: 115/67  RR: 18Vital Signs Last 24 Hrs  T(C): 36.7 (13 Feb 2025 04:25), Max: 36.7 (12 Feb 2025 20:32)  T(F): 98 (13 Feb 2025 04:25), Max: 98 (12 Feb 2025 20:32)  HR: 71 (13 Feb 2025 04:25) (71 - 99)  BP: 115/67 (13 Feb 2025 04:25) (115/67 - 128/75)  BP(mean): --  RR: 18 (13 Feb 2025 04:25) (18 - 18)  SpO2: 100% (13 Feb 2025 04:25) (100% - 100%)    Parameters below as of 13 Feb 2025 04:25  Patient On (Oxygen Delivery Method): room air        PHYSICAL EXAM:  Gen: NAD, resting in bed  HEENT: Normocephalic, atraumatic  Neck: supple, no lymphadenopathy  CV: Regular rate & regular rhythm  Lungs: CTABL no wheeze  Abdomen: Soft, NTND+ BS present  Ext: Warm, well perfused no CCE  Neuro: non focal, awake, CN II-XII intact   Skin: no rash, no erythema  Psych: no SI, HI, Hallucination     TESTS & MEASUREMENTS:                        14.2   10.78 )-----------( 449      ( 12 Feb 2025 19:47 )             43.0     02-12    141  |  103  |  7[L]  ----------------------------<  78  4.4   |  26  |  0.9    Ca    9.9      12 Feb 2025 19:47  Mg     2.3     02-12    TPro  7.5  /  Alb  4.7  /  TBili  0.4  /  DBili  x   /  AST  13  /  ALT  13  /  AlkPhos  96  02-12      LIVER FUNCTIONS - ( 12 Feb 2025 19:47 )  Alb: 4.7 g/dL / Pro: 7.5 g/dL / ALK PHOS: 96 U/L / ALT: 13 U/L / AST: 13 U/L / GGT: x           Urinalysis Basic - ( 12 Feb 2025 19:47 )    Color: x / Appearance: x / SG: x / pH: x  Gluc: 78 mg/dL / Ketone: x  / Bili: x / Urobili: x   Blood: x / Protein: x / Nitrite: x   Leuk Esterase: x / RBC: x / WBC x   Sq Epi: x / Non Sq Epi: x / Bacteria: x            QRS axis to [] ° and NSR at a rate of [] BPM. There was no atrial enlargement. There was no ventricular hypertrophy. There were no ST-T changes and all intervals were normal.      INFECTIOUS DISEASES TESTING      RADIOLOGY & ADDITIONAL TESTS:  I have personally reviewed the last Chest xray  CXR      CT      CARDIOLOGY TESTING      MEDICATIONS  (STANDING):  ethosuximide 250 milliGRAM(s) Oral <User Schedule>    MEDICATIONS  (PRN):  acetaminophen     Tablet .. 650 milliGRAM(s) Oral every 6 hours PRN Temp greater or equal to 38C (100.4F), Mild Pain (1 - 3)  albuterol    90 MICROgram(s) HFA Inhaler 1 Puff(s) Inhalation every 4 hours PRN Shortness of Breath and/or Wheezing  aluminum hydroxide/magnesium hydroxide/simethicone Suspension 30 milliLiter(s) Oral every 4 hours PRN Dyspepsia  LORazepam   Injectable 2 milliGRAM(s) IV Push three times a day PRN generalized tonic-clonic seizure lasting longer than 2 minutes, or two consecutive seizures without return to baseline in-between  ondansetron Injectable 4 milliGRAM(s) IV Push every 8 hours PRN Nausea and/or Vomiting      ANTIBIOTICS:      All available historical data has been reviewed    ASSESSMENT  24y F admitted with Other seizure        PROBLEMS  
Epilepsy Team Discharge Note:    VEEG monitoring completed, patient is cleared for discharge from neurology standpoint.    Follow up neurology appointment is scheduled with Dr. Mercer   for August 19, 2025 (with [facility]).    [address] [place] [address], [address]  [phone]    Discharge seizure medications are:  Zarontin 250mg q12hrs x 1 week, then 250mg QHS x 1 week, then stop.    Detailed  instructions regarding follow up plan are given to the patient, all questions answered. Patient verbalized understanding.    Discussed with medical and nursing teams.  
Epilepsy Attending Note:     MERLENE FOY    24y Female  MRN [id number]    Vital Signs Last 24 Hrs  T(C): 36.7 (13 Feb 2025 04:25), Max: 36.7 (12 Feb 2025 20:32)  T(F): 98 (13 Feb 2025 04:25), Max: 98 (12 Feb 2025 20:32)  HR: 71 (13 Feb 2025 04:25) (71 - 99)  BP: 115/67 (13 Feb 2025 04:25) (115/67 - 128/75)  BP(mean): --  RR: 18 (13 Feb 2025 04:25) (18 - 18)  SpO2: 100% (13 Feb 2025 04:25) (100% - 100%)    Parameters below as of 13 Feb 2025 04:25  Patient On (Oxygen Delivery Method): room air                              14.2   10.78 )-----------( 449      ( 12 Feb 2025 19:47 )             43.0       02-12    141  |  103  |  7[L]  ----------------------------<  78  4.4   |  26  |  0.9    Ca    9.9      12 Feb 2025 19:47  Mg     2.3     02-12    TPro  7.5  /  Alb  4.7  /  TBili  0.4  /  DBili  x   /  AST  13  /  ALT  13  /  AlkPhos  96  02-12      MEDICATIONS  (STANDING):  ethosuximide 250 milliGRAM(s) Oral <User Schedule>    MEDICATIONS  (PRN):  acetaminophen     Tablet .. 650 milliGRAM(s) Oral every 6 hours PRN Temp greater or equal to 38C (100.4F), Mild Pain (1 - 3)  albuterol    90 MICROgram(s) HFA Inhaler 1 Puff(s) Inhalation every 4 hours PRN Shortness of Breath and/or Wheezing  aluminum hydroxide/magnesium hydroxide/simethicone Suspension 30 milliLiter(s) Oral every 4 hours PRN Dyspepsia  LORazepam   Injectable 2 milliGRAM(s) IV Push three times a day PRN generalized tonic-clonic seizure lasting longer than 2 minutes, or two consecutive seizures without return to baseline in-between  ondansetron Injectable 4 milliGRAM(s) IV Push every 8 hours PRN Nausea and/or Vomiting            VEEG in the last 24 hours:    Background - continuous, symmetrical, well organized, reaching frequencies in the range of 8-9 Hz, showing good reactivity and normal sleep patterns. Symmetrical response to photic stimulation.    Focal and generalized slowing - none    Interictal activity - none    Events - none    Seizures - none    Impression: Normal VEEG x 24 hrs    Plan -   Will continue monitoring  Seizure precautions  Decrease Zarontin to 250mg QHS

## 2025-02-14 NOTE — DISCHARGE NOTE PROVIDER - ATTENDING DISCHARGE PHYSICAL EXAMINATION:
T(C): 36.4 (02-14-25 @ 04:28), Max: 36.8 (02-13-25 @ 21:00)  HR: 108 (02-14-25 @ 04:28) (91 - 108)  BP: 95/62 (02-14-25 @ 04:28) (95/62 - 130/81)  RR: 18 (02-14-25 @ 04:28) (16 - 18)  SpO2: 100% (02-14-25 @ 04:28) (100% - 100%)    CONSTITUTIONAL: Well groomed, no apparent distress  EYES: PERRLA and symmetric, EOMI, No conjunctival or scleral injection, non-icteric  ENMT: Oral mucosa with moist membranes. Normal dentition; no pharyngeal injection or exudates             NECK: Supple, symmetric and without tracheal deviation   RESP: No respiratory distress, no use of accessory muscles; CTA b/l, no WRR  CV: RRR, +S1S2, no MRG; no JVD; no peripheral edema  GI: Soft, NT, ND, no rebound, no guarding; no palpable masses; no hepatosplenomegaly; no hernia palpated  LYMPH: No cervical LAD or tenderness; no axillary LAD or tenderness; no inguinal LAD or tenderness  MSK: Normal gait; No digital clubbing or cyanosis; examination of the (head/neck/spine/ribs/pelvis, RUE, LUE, RLE, LLE) without misalignment,            Normal ROM without pain, no spinal tenderness, normal muscle strength/tone  SKIN: No rashes or ulcers noted; no subcutaneous nodules or induration palpable  NEURO: CN II-XII intact; normal reflexes in upper and lower extremities, sensation intact in upper and lower extremities b/l to light touch   PSYCH: Appropriate insight/judgment; A+O x 3, mood and affect appropriate, recent/remote memory intact

## 2025-02-14 NOTE — DISCHARGE NOTE PROVIDER - NSDCFUADDINST_GEN_ALL_CORE_FT
Please follow up with neurologist Dr. Mercer within 1 week.   If you experience any seizure like episodes at home, report to the nearest ER.  Please take the Zarontin 250mg at bedtime every night as prescribed by the neurologist Dr. Mercer.

## 2025-02-14 NOTE — DISCHARGE NOTE NURSING/CASE MANAGEMENT/SOCIAL WORK - FINANCIAL ASSISTANCE
[facility] provides services at a reduced cost to those who are determined to be eligible through [facility]’s financial assistance program. Information regarding [facility]’s financial assistance program can be found by going to [email or web address] or by calling [phone].

## 2025-02-15 LAB — ETHOSUXIMIDE SERPL-MCNC: 64 UG/ML — SIGNIFICANT CHANGE UP (ref 40–100)

## 2025-02-19 ENCOUNTER — EMERGENCY (EMERGENCY)
Facility: HOSPITAL | Age: 25
LOS: 0 days | Discharge: HOME | End: 2025-02-19
Attending: EMERGENCY MEDICINE
Payer: COMMERCIAL

## 2025-02-19 VITALS
HEART RATE: 91 BPM | WEIGHT: 147.93 LBS | RESPIRATION RATE: 18 BRPM | TEMPERATURE: 98 F | DIASTOLIC BLOOD PRESSURE: 91 MMHG | SYSTOLIC BLOOD PRESSURE: 138 MMHG | OXYGEN SATURATION: 100 % | HEIGHT: 63 IN

## 2025-02-19 DIAGNOSIS — Z88.0 ALLERGY STATUS TO PENICILLIN: ICD-10-CM

## 2025-02-19 DIAGNOSIS — R20.2 PARESTHESIA OF SKIN: ICD-10-CM

## 2025-02-19 PROCEDURE — 99282 EMERGENCY DEPT VISIT SF MDM: CPT

## 2025-02-19 PROCEDURE — 99283 EMERGENCY DEPT VISIT LOW MDM: CPT

## 2025-02-19 NOTE — ED ADULT NURSE NOTE - NSFALLUNIVINTERV_ED_ALL_ED
Bed/Stretcher in lowest position, wheels locked, appropriate side rails in place/Call bell, personal items and telephone in reach/Instruct patient to call for assistance before getting out of bed/chair/stretcher/Non-slip footwear applied when patient is off stretcher/[place] to call system/Physically safe environment - no spills, clutter or unnecessary equipment/Purposeful proactive rounding/Room/bathroom lighting operational, light cord in reach

## 2025-02-19 NOTE — ED PROVIDER NOTE - OBJECTIVE STATEMENT
Patient is a 24-year-old female recently discharged from the hospital last week here for evaluation of tingling to her left third fourth and fifth digits for the past 2 days with associated discoloration last night which prompted this morning visit.  Of note, patient had IV placed in left hand with notable bruising in the area of the IV.  Patient denies weakness, numbness, fever, chills

## 2025-02-19 NOTE — ED ADULT TRIAGE NOTE - CHIEF COMPLAINT QUOTE
Pt states " I was admitted and D/C friday 2/14.  I had an IV in my left arm in multiple spots. One hit my artery. I  noticed my middle, ring, and pinky finger were discolored this morning and I am having tingling"

## 2025-02-19 NOTE — ED PROVIDER NOTE - CLINICAL SUMMARY MEDICAL DECISION MAKING FREE TEXT BOX
Patient with tingling to the fourth and fifth digits after venipuncture, noted them to be transiently purple prompting visit, on exam vitals appreciated, well-appearing, has fading ecchymosis and swelling to the dorsum of the left hand, has cap refill less than 2 seconds, strong radial and ulnar pulses, full range of motion, neurovascularly intact, patient reassured, will discharge, counseled regarding conditions which should prompt return.

## 2025-02-19 NOTE — ED PROVIDER NOTE - PATIENT PORTAL LINK FT
You can access the FollowMyHealth Patient Portal offered by [facility] by registering at the following website: [email or web address]. By joining [employer]’s FollowMyHealth portal, you will also be able to view your health information using other applications (apps) compatible with our system.

## 2025-02-19 NOTE — ED PROVIDER NOTE - PHYSICAL EXAMINATION
VITAL SIGNS: I have reviewed nursing notes and confirm.  CONSTITUTIONAL: Well-developed; well-nourished; in no acute distress.   SKIN: skin exam is warm and dry, no acute rash.    HEAD: Normocephalic; atraumatic.  EYES: conjunctiva and sclera clear.  ENT: No nasal discharge; airway clear.  EXT: radial and brachial pulses present, sensation intact, motor function intact Normal ROM.  No clubbing, cyanosis or edema.   NEURO: Alert, oriented, grossly unremarkable

## 2025-03-19 ENCOUNTER — RX RENEWAL (OUTPATIENT)
Age: 25
End: 2025-03-19

## 2025-09-15 ENCOUNTER — NON-APPOINTMENT (OUTPATIENT)
Age: 25
End: 2025-09-15

## 2025-09-17 ENCOUNTER — APPOINTMENT (OUTPATIENT)
Dept: NEUROLOGY | Facility: CLINIC | Age: 25
End: 2025-09-17
Payer: COMMERCIAL

## 2025-09-17 VITALS
SYSTOLIC BLOOD PRESSURE: 128 MMHG | WEIGHT: 140 LBS | BODY MASS INDEX: 25.76 KG/M2 | HEIGHT: 62 IN | DIASTOLIC BLOOD PRESSURE: 86 MMHG | HEART RATE: 86 BPM

## 2025-09-17 DIAGNOSIS — G40.309 GENERALIZED IDIOPATHIC EPILEPSY AND EPILEPTIC SYNDROMES, NOT INTRACTABLE, W/OUT STATUS EPILEPTICUS: ICD-10-CM

## 2025-09-17 PROCEDURE — 95816 EEG AWAKE AND DROWSY: CPT

## 2025-09-17 PROCEDURE — 99213 OFFICE O/P EST LOW 20 MIN: CPT
